# Patient Record
Sex: FEMALE | Race: BLACK OR AFRICAN AMERICAN | NOT HISPANIC OR LATINO
[De-identification: names, ages, dates, MRNs, and addresses within clinical notes are randomized per-mention and may not be internally consistent; named-entity substitution may affect disease eponyms.]

---

## 2017-01-25 ENCOUNTER — RESULT REVIEW (OUTPATIENT)
Age: 57
End: 2017-01-25

## 2017-04-18 ENCOUNTER — RESULT REVIEW (OUTPATIENT)
Age: 57
End: 2017-04-18

## 2017-05-26 ENCOUNTER — RX RENEWAL (OUTPATIENT)
Age: 57
End: 2017-05-26

## 2017-07-27 ENCOUNTER — APPOINTMENT (OUTPATIENT)
Dept: NEUROLOGY | Facility: CLINIC | Age: 57
End: 2017-07-27
Payer: COMMERCIAL

## 2017-07-27 VITALS
BODY MASS INDEX: 49.61 KG/M2 | SYSTOLIC BLOOD PRESSURE: 125 MMHG | OXYGEN SATURATION: 98 % | DIASTOLIC BLOOD PRESSURE: 80 MMHG | WEIGHT: 280 LBS | HEART RATE: 88 BPM | HEIGHT: 63 IN

## 2017-07-27 PROCEDURE — 99204 OFFICE O/P NEW MOD 45 MIN: CPT

## 2017-07-30 ENCOUNTER — OUTPATIENT (OUTPATIENT)
Dept: OUTPATIENT SERVICES | Facility: HOSPITAL | Age: 57
LOS: 1 days | End: 2017-07-30
Payer: COMMERCIAL

## 2017-07-30 PROCEDURE — 70553 MRI BRAIN STEM W/O & W/DYE: CPT | Mod: 26

## 2017-07-30 PROCEDURE — A9585: CPT

## 2017-07-30 PROCEDURE — 70553 MRI BRAIN STEM W/O & W/DYE: CPT

## 2017-08-09 ENCOUNTER — APPOINTMENT (OUTPATIENT)
Dept: SPINE | Facility: CLINIC | Age: 57
End: 2017-08-09

## 2017-08-23 ENCOUNTER — APPOINTMENT (OUTPATIENT)
Dept: SPINE | Facility: CLINIC | Age: 57
End: 2017-08-23
Payer: COMMERCIAL

## 2017-08-23 VITALS
SYSTOLIC BLOOD PRESSURE: 143 MMHG | BODY MASS INDEX: 49.43 KG/M2 | HEART RATE: 87 BPM | DIASTOLIC BLOOD PRESSURE: 89 MMHG | WEIGHT: 279 LBS | HEIGHT: 63 IN

## 2017-08-23 PROCEDURE — 99214 OFFICE O/P EST MOD 30 MIN: CPT

## 2017-08-25 ENCOUNTER — APPOINTMENT (OUTPATIENT)
Dept: NEUROLOGY | Facility: CLINIC | Age: 57
End: 2017-08-25
Payer: COMMERCIAL

## 2017-08-25 VITALS
HEIGHT: 63 IN | HEART RATE: 89 BPM | SYSTOLIC BLOOD PRESSURE: 142 MMHG | BODY MASS INDEX: 47.84 KG/M2 | WEIGHT: 270 LBS | DIASTOLIC BLOOD PRESSURE: 82 MMHG

## 2017-08-25 DIAGNOSIS — R42 DIZZINESS AND GIDDINESS: ICD-10-CM

## 2017-08-25 DIAGNOSIS — G47.33 OBSTRUCTIVE SLEEP APNEA (ADULT) (PEDIATRIC): ICD-10-CM

## 2017-08-25 DIAGNOSIS — M89.9 DISORDER OF BONE, UNSPECIFIED: ICD-10-CM

## 2017-08-25 PROCEDURE — 99213 OFFICE O/P EST LOW 20 MIN: CPT

## 2017-10-25 ENCOUNTER — APPOINTMENT (OUTPATIENT)
Dept: PULMONOLOGY | Facility: CLINIC | Age: 57
End: 2017-10-25

## 2017-12-01 ENCOUNTER — APPOINTMENT (OUTPATIENT)
Dept: NEUROLOGY | Facility: CLINIC | Age: 57
End: 2017-12-01

## 2017-12-05 ENCOUNTER — APPOINTMENT (OUTPATIENT)
Dept: PULMONOLOGY | Facility: CLINIC | Age: 57
End: 2017-12-05

## 2018-01-02 ENCOUNTER — APPOINTMENT (OUTPATIENT)
Dept: PULMONOLOGY | Facility: CLINIC | Age: 58
End: 2018-01-02
Payer: COMMERCIAL

## 2018-01-02 VITALS
TEMPERATURE: 97.8 F | WEIGHT: 287 LBS | RESPIRATION RATE: 18 BRPM | BODY MASS INDEX: 50.85 KG/M2 | DIASTOLIC BLOOD PRESSURE: 80 MMHG | SYSTOLIC BLOOD PRESSURE: 130 MMHG | OXYGEN SATURATION: 98 % | HEIGHT: 63 IN | HEART RATE: 87 BPM

## 2018-01-02 DIAGNOSIS — G47.33 OBSTRUCTIVE SLEEP APNEA (ADULT) (PEDIATRIC): ICD-10-CM

## 2018-01-02 PROCEDURE — 99204 OFFICE O/P NEW MOD 45 MIN: CPT

## 2018-01-05 ENCOUNTER — RESULT REVIEW (OUTPATIENT)
Age: 58
End: 2018-01-05

## 2018-01-31 ENCOUNTER — APPOINTMENT (OUTPATIENT)
Dept: BARIATRICS/WEIGHT MGMT | Facility: CLINIC | Age: 58
End: 2018-01-31

## 2018-02-27 ENCOUNTER — RESULT REVIEW (OUTPATIENT)
Age: 58
End: 2018-02-27

## 2018-04-06 ENCOUNTER — FORM ENCOUNTER (OUTPATIENT)
Age: 58
End: 2018-04-06

## 2018-05-04 ENCOUNTER — FORM ENCOUNTER (OUTPATIENT)
Age: 58
End: 2018-05-04

## 2018-06-18 ENCOUNTER — APPOINTMENT (OUTPATIENT)
Dept: ORTHOPEDIC SURGERY | Facility: CLINIC | Age: 58
End: 2018-06-18

## 2018-08-10 ENCOUNTER — APPOINTMENT (OUTPATIENT)
Dept: ORTHOPEDIC SURGERY | Facility: CLINIC | Age: 58
End: 2018-08-10
Payer: COMMERCIAL

## 2018-08-10 VITALS — SYSTOLIC BLOOD PRESSURE: 126 MMHG | DIASTOLIC BLOOD PRESSURE: 83 MMHG | HEART RATE: 111 BPM

## 2018-08-10 VITALS — HEIGHT: 63 IN | BODY MASS INDEX: 49.26 KG/M2 | WEIGHT: 278 LBS

## 2018-08-10 DIAGNOSIS — M65.312 TRIGGER THUMB, LEFT THUMB: ICD-10-CM

## 2018-08-10 PROCEDURE — 20550 NJX 1 TENDON SHEATH/LIGAMENT: CPT | Mod: LT

## 2018-08-10 PROCEDURE — 99203 OFFICE O/P NEW LOW 30 MIN: CPT | Mod: 25

## 2018-08-20 ENCOUNTER — APPOINTMENT (OUTPATIENT)
Dept: ORTHOPEDIC SURGERY | Facility: CLINIC | Age: 58
End: 2018-08-20
Payer: COMMERCIAL

## 2018-08-20 VITALS
WEIGHT: 270 LBS | HEIGHT: 63 IN | SYSTOLIC BLOOD PRESSURE: 146 MMHG | BODY MASS INDEX: 47.84 KG/M2 | DIASTOLIC BLOOD PRESSURE: 79 MMHG | HEART RATE: 69 BPM

## 2018-08-20 PROCEDURE — 99214 OFFICE O/P EST MOD 30 MIN: CPT

## 2018-08-20 PROCEDURE — 73564 X-RAY EXAM KNEE 4 OR MORE: CPT | Mod: LT

## 2019-02-24 ENCOUNTER — OUTPATIENT (OUTPATIENT)
Dept: OUTPATIENT SERVICES | Facility: HOSPITAL | Age: 59
LOS: 1 days | End: 2019-02-24
Payer: COMMERCIAL

## 2019-02-24 ENCOUNTER — APPOINTMENT (OUTPATIENT)
Dept: MRI IMAGING | Facility: HOSPITAL | Age: 59
End: 2019-02-24
Payer: COMMERCIAL

## 2019-02-24 PROCEDURE — 70553 MRI BRAIN STEM W/O & W/DYE: CPT | Mod: 26

## 2019-02-24 PROCEDURE — 70553 MRI BRAIN STEM W/O & W/DYE: CPT

## 2020-08-18 ENCOUNTER — RESULT REVIEW (OUTPATIENT)
Age: 60
End: 2020-08-18

## 2020-09-29 ENCOUNTER — RESULT REVIEW (OUTPATIENT)
Age: 60
End: 2020-09-29

## 2020-12-04 ENCOUNTER — OUTPATIENT (OUTPATIENT)
Dept: OUTPATIENT SERVICES | Facility: HOSPITAL | Age: 60
LOS: 1 days | End: 2020-12-04
Payer: COMMERCIAL

## 2020-12-04 VITALS
OXYGEN SATURATION: 95 % | TEMPERATURE: 98 F | SYSTOLIC BLOOD PRESSURE: 136 MMHG | DIASTOLIC BLOOD PRESSURE: 96 MMHG | RESPIRATION RATE: 18 BRPM | WEIGHT: 279.99 LBS | HEIGHT: 62 IN | HEART RATE: 94 BPM

## 2020-12-04 DIAGNOSIS — E11.9 TYPE 2 DIABETES MELLITUS WITHOUT COMPLICATIONS: ICD-10-CM

## 2020-12-04 DIAGNOSIS — N95.0 POSTMENOPAUSAL BLEEDING: ICD-10-CM

## 2020-12-04 DIAGNOSIS — Z01.818 ENCOUNTER FOR OTHER PREPROCEDURAL EXAMINATION: ICD-10-CM

## 2020-12-04 DIAGNOSIS — N84.0 POLYP OF CORPUS UTERI: ICD-10-CM

## 2020-12-04 DIAGNOSIS — I10 ESSENTIAL (PRIMARY) HYPERTENSION: ICD-10-CM

## 2020-12-04 DIAGNOSIS — D21.9 BENIGN NEOPLASM OF CONNECTIVE AND OTHER SOFT TISSUE, UNSPECIFIED: Chronic | ICD-10-CM

## 2020-12-04 LAB
ANION GAP SERPL CALC-SCNC: 5 MMOL/L — SIGNIFICANT CHANGE UP (ref 5–17)
APTT BLD: 32.8 SEC — SIGNIFICANT CHANGE UP (ref 27.5–35.5)
BLD GP AB SCN SERPL QL: SIGNIFICANT CHANGE UP
BUN SERPL-MCNC: 18 MG/DL — SIGNIFICANT CHANGE UP (ref 7–18)
CALCIUM SERPL-MCNC: 9.5 MG/DL — SIGNIFICANT CHANGE UP (ref 8.4–10.5)
CHLORIDE SERPL-SCNC: 107 MMOL/L — SIGNIFICANT CHANGE UP (ref 96–108)
CO2 SERPL-SCNC: 29 MMOL/L — SIGNIFICANT CHANGE UP (ref 22–31)
CREAT SERPL-MCNC: 0.88 MG/DL — SIGNIFICANT CHANGE UP (ref 0.5–1.3)
GLUCOSE SERPL-MCNC: 123 MG/DL — HIGH (ref 70–99)
HCT VFR BLD CALC: 41.3 % — SIGNIFICANT CHANGE UP (ref 34.5–45)
HGB BLD-MCNC: 12.7 G/DL — SIGNIFICANT CHANGE UP (ref 11.5–15.5)
INR BLD: 0.97 RATIO — SIGNIFICANT CHANGE UP (ref 0.88–1.16)
MCHC RBC-ENTMCNC: 26.8 PG — LOW (ref 27–34)
MCHC RBC-ENTMCNC: 30.8 GM/DL — LOW (ref 32–36)
MCV RBC AUTO: 87.1 FL — SIGNIFICANT CHANGE UP (ref 80–100)
NRBC # BLD: 0 /100 WBCS — SIGNIFICANT CHANGE UP (ref 0–0)
PLATELET # BLD AUTO: 260 K/UL — SIGNIFICANT CHANGE UP (ref 150–400)
POTASSIUM SERPL-MCNC: 3.4 MMOL/L — LOW (ref 3.5–5.3)
POTASSIUM SERPL-SCNC: 3.4 MMOL/L — LOW (ref 3.5–5.3)
PROTHROM AB SERPL-ACNC: 11.6 SEC — SIGNIFICANT CHANGE UP (ref 10.6–13.6)
RBC # BLD: 4.74 M/UL — SIGNIFICANT CHANGE UP (ref 3.8–5.2)
RBC # FLD: 14.5 % — SIGNIFICANT CHANGE UP (ref 10.3–14.5)
SODIUM SERPL-SCNC: 141 MMOL/L — SIGNIFICANT CHANGE UP (ref 135–145)
WBC # BLD: 7.29 K/UL — SIGNIFICANT CHANGE UP (ref 3.8–10.5)
WBC # FLD AUTO: 7.29 K/UL — SIGNIFICANT CHANGE UP (ref 3.8–10.5)

## 2020-12-04 RX ORDER — SODIUM CHLORIDE 9 MG/ML
3 INJECTION INTRAMUSCULAR; INTRAVENOUS; SUBCUTANEOUS EVERY 8 HOURS
Refills: 0 | Status: DISCONTINUED | OUTPATIENT
Start: 2020-12-18 | End: 2020-12-18

## 2020-12-04 NOTE — H&P PST ADULT - NSICDXPROBLEM_GEN_ALL_CORE_FT
PROBLEM DIAGNOSES  Problem: Postmenopausal bleeding  Assessment and Plan: Dilation and Curettage  Hysteroscopy      Problem: Polyp of corpus uteri  Assessment and Plan: Dilation and Curettage  Hysteroscopy      Problem: Diabetes  Assessment and Plan: No blood sugar medication before coming to the hospital     Problem: Hypertension  Assessment and Plan: Continue your medication as prescribed

## 2020-12-04 NOTE — H&P PST ADULT - HISTORY OF PRESENT ILLNESS
61 yo female with history of Obesity, DM, HTN, reports the above.   She is scheduled for : Dilation and Curettage  Hysteroscopy, on 12/18/20

## 2020-12-13 PROBLEM — Z00.00 ENCOUNTER FOR PREVENTIVE HEALTH EXAMINATION: Noted: 2020-12-13

## 2020-12-15 ENCOUNTER — APPOINTMENT (OUTPATIENT)
Dept: DISASTER EMERGENCY | Facility: CLINIC | Age: 60
End: 2020-12-15

## 2020-12-16 LAB — SARS-COV-2 N GENE NPH QL NAA+PROBE: NOT DETECTED

## 2020-12-17 ENCOUNTER — TRANSCRIPTION ENCOUNTER (OUTPATIENT)
Age: 60
End: 2020-12-17

## 2020-12-18 ENCOUNTER — RESULT REVIEW (OUTPATIENT)
Age: 60
End: 2020-12-18

## 2020-12-18 ENCOUNTER — OUTPATIENT (OUTPATIENT)
Dept: OUTPATIENT SERVICES | Facility: HOSPITAL | Age: 60
LOS: 1 days | End: 2020-12-18
Payer: COMMERCIAL

## 2020-12-18 VITALS
SYSTOLIC BLOOD PRESSURE: 146 MMHG | OXYGEN SATURATION: 100 % | HEART RATE: 81 BPM | DIASTOLIC BLOOD PRESSURE: 70 MMHG | RESPIRATION RATE: 17 BRPM | TEMPERATURE: 98 F

## 2020-12-18 VITALS
DIASTOLIC BLOOD PRESSURE: 96 MMHG | HEIGHT: 62 IN | RESPIRATION RATE: 18 BRPM | OXYGEN SATURATION: 98 % | WEIGHT: 279.99 LBS | HEART RATE: 97 BPM | TEMPERATURE: 98 F | SYSTOLIC BLOOD PRESSURE: 151 MMHG

## 2020-12-18 DIAGNOSIS — Z01.818 ENCOUNTER FOR OTHER PREPROCEDURAL EXAMINATION: ICD-10-CM

## 2020-12-18 DIAGNOSIS — N84.0 POLYP OF CORPUS UTERI: ICD-10-CM

## 2020-12-18 DIAGNOSIS — D21.9 BENIGN NEOPLASM OF CONNECTIVE AND OTHER SOFT TISSUE, UNSPECIFIED: Chronic | ICD-10-CM

## 2020-12-18 DIAGNOSIS — N95.0 POSTMENOPAUSAL BLEEDING: ICD-10-CM

## 2020-12-18 LAB — BLD GP AB SCN SERPL QL: SIGNIFICANT CHANGE UP

## 2020-12-18 PROCEDURE — 88305 TISSUE EXAM BY PATHOLOGIST: CPT | Mod: 26

## 2020-12-18 RX ORDER — ONDANSETRON 8 MG/1
4 TABLET, FILM COATED ORAL ONCE
Refills: 0 | Status: DISCONTINUED | OUTPATIENT
Start: 2020-12-18 | End: 2020-12-18

## 2020-12-18 RX ORDER — OXYCODONE AND ACETAMINOPHEN 5; 325 MG/1; MG/1
2 TABLET ORAL EVERY 6 HOURS
Refills: 0 | Status: DISCONTINUED | OUTPATIENT
Start: 2020-12-18 | End: 2020-12-18

## 2020-12-18 RX ORDER — SODIUM CHLORIDE 9 MG/ML
1000 INJECTION, SOLUTION INTRAVENOUS
Refills: 0 | Status: DISCONTINUED | OUTPATIENT
Start: 2020-12-18 | End: 2020-12-18

## 2020-12-18 RX ORDER — HYDROMORPHONE HYDROCHLORIDE 2 MG/ML
0.5 INJECTION INTRAMUSCULAR; INTRAVENOUS; SUBCUTANEOUS
Refills: 0 | Status: DISCONTINUED | OUTPATIENT
Start: 2020-12-18 | End: 2020-12-18

## 2020-12-18 RX ORDER — ONDANSETRON 8 MG/1
4 TABLET, FILM COATED ORAL EVERY 6 HOURS
Refills: 0 | Status: DISCONTINUED | OUTPATIENT
Start: 2020-12-18 | End: 2020-12-25

## 2020-12-18 RX ORDER — IBUPROFEN 200 MG
400 TABLET ORAL EVERY 8 HOURS
Refills: 0 | Status: DISCONTINUED | OUTPATIENT
Start: 2020-12-18 | End: 2020-12-25

## 2020-12-18 RX ADMIN — HYDROMORPHONE HYDROCHLORIDE 0.5 MILLIGRAM(S): 2 INJECTION INTRAMUSCULAR; INTRAVENOUS; SUBCUTANEOUS at 11:49

## 2020-12-18 RX ADMIN — SODIUM CHLORIDE 3 MILLILITER(S): 9 INJECTION INTRAMUSCULAR; INTRAVENOUS; SUBCUTANEOUS at 07:42

## 2020-12-18 NOTE — BRIEF OPERATIVE NOTE - NSICDXBRIEFPROCEDURE_GEN_ALL_CORE_FT
PROCEDURES:  Hysteroscopy 18-Dec-2020 11:31:59  Rory Gold  D&C (dilatation and curettage, scraping of uterus) 18-Dec-2020 11:31:49  Rory Gold

## 2020-12-18 NOTE — ASU DISCHARGE PLAN (ADULT/PEDIATRIC) - CARE PROVIDER_API CALL
Rory Gold (DO)  Obstetrics and Gynecology  [address] [place] [address]  Phone: [phone]  Fax: [phone]  Established Patient  Follow Up Time: 2 weeks

## 2020-12-22 LAB — SURGICAL PATHOLOGY STUDY: SIGNIFICANT CHANGE UP

## 2022-02-04 RX ORDER — MAGNESIUM OXIDE 400 MG ORAL TABLET 241.3 MG
1 TABLET ORAL
Qty: 0 | Refills: 0 | DISCHARGE

## 2022-02-04 RX ORDER — METFORMIN HYDROCHLORIDE 850 MG/1
1 TABLET ORAL
Qty: 0 | Refills: 0 | DISCHARGE

## 2022-04-25 PROBLEM — I10 ESSENTIAL (PRIMARY) HYPERTENSION: Chronic | Status: ACTIVE | Noted: 2020-12-04

## 2022-05-09 ENCOUNTER — RESULT REVIEW (OUTPATIENT)
Age: 62
End: 2022-05-09

## 2022-05-16 ENCOUNTER — APPOINTMENT (OUTPATIENT)
Dept: SURGERY | Facility: CLINIC | Age: 62
End: 2022-05-16
Payer: COMMERCIAL

## 2022-05-16 VITALS
WEIGHT: 278 LBS | BODY MASS INDEX: 51.16 KG/M2 | HEIGHT: 62 IN | OXYGEN SATURATION: 99 % | HEART RATE: 77 BPM | RESPIRATION RATE: 18 BRPM | DIASTOLIC BLOOD PRESSURE: 90 MMHG | TEMPERATURE: 97.8 F | SYSTOLIC BLOOD PRESSURE: 160 MMHG

## 2022-05-16 DIAGNOSIS — Z63.5 DISRUPTION OF FAMILY BY SEPARATION AND DIVORCE: ICD-10-CM

## 2022-05-16 DIAGNOSIS — M54.9 DORSALGIA, UNSPECIFIED: ICD-10-CM

## 2022-05-16 DIAGNOSIS — Z82.49 FAMILY HISTORY OF ISCHEMIC HEART DISEASE AND OTHER DISEASES OF THE CIRCULATORY SYSTEM: ICD-10-CM

## 2022-05-16 DIAGNOSIS — M19.90 UNSPECIFIED OSTEOARTHRITIS, UNSPECIFIED SITE: ICD-10-CM

## 2022-05-16 PROCEDURE — 99204 OFFICE O/P NEW MOD 45 MIN: CPT

## 2022-05-16 RX ORDER — HYALURONATE SOD, CROSS-LINKED 30 MG/3 ML
30 SYRINGE (ML) INTRAARTICULAR
Qty: 1 | Refills: 0 | Status: DISCONTINUED | COMMUNITY
Start: 2018-08-20 | End: 2022-05-16

## 2022-05-16 RX ORDER — MECLIZINE HYDROCHLORIDE 12.5 MG/1
12.5 TABLET ORAL 3 TIMES DAILY
Qty: 90 | Refills: 1 | Status: DISCONTINUED | COMMUNITY
Start: 2017-07-27 | End: 2022-05-16

## 2022-05-16 RX ORDER — MELOXICAM 15 MG/1
TABLET ORAL
Refills: 0 | Status: DISCONTINUED | COMMUNITY
End: 2022-05-16

## 2022-05-16 RX ORDER — LOSARTAN POTASSIUM 100 MG/1
100 TABLET, FILM COATED ORAL
Refills: 0 | Status: DISCONTINUED | COMMUNITY
End: 2022-05-16

## 2022-05-16 RX ORDER — DICLOFENAC SODIUM 50 MG/1
50 TABLET, DELAYED RELEASE ORAL
Qty: 60 | Refills: 2 | Status: DISCONTINUED | COMMUNITY
Start: 2018-08-20 | End: 2022-05-16

## 2022-05-16 SDOH — SOCIAL STABILITY - SOCIAL INSECURITY: DISRUPTION OF FAMILY BY SEPARATION AND DIVORCE: Z63.5

## 2022-05-16 NOTE — HISTORY OF PRESENT ILLNESS
[de-identified] : VESNA  is a 61 year  female  here for a consultation for weight loss surgery. \par The patient has been struggling with obesity for years.  She has attempted several diet and exercise programs without success.  She feels a mix of life stressors, unhealthy eating, and sedentary work have made it very difficult to lose the weight.  The excess weight is starting to significantly affect her health and lifestyle.\par \par Medical history is significant for morbid obesity, hypertension, type 2 diabetes.\par Surgical history is significant for myomectomy.\par The patient denies prior history of blood clot or abnormal bleeding.\par The patient denies prior history of dysphagia.\par She is a non-smoker.\par She works as a .\par \par Referred by her primary care doctor, Dr. Vonnie Douglas.

## 2022-05-16 NOTE — PHYSICAL EXAM
[Obese, well nourished, in no acute distress] : obese, well nourished, in no acute distress [Normal] : affect appropriate [de-identified] : Normal respirations [de-identified] : Soft, nontender, nondistended

## 2022-05-16 NOTE — ASSESSMENT
[FreeTextEntry1] : 61-year-old female with longstanding history of morbid obesity (height 5 feet 2 inches, weight 278 pounds, BMI 50) with comorbidities of hypertension and type 2 diabetes presents for evaluation for bariatric surgery.\par \par The patient has failed multiple prior attempts at weight loss and is now dealing with the side effects, risk factors, and poor quality of life associated with morbid obesity.  They would benefit from surgical weight loss as outlined in the NIH and  ASMBS consensus statements on bariatric surgery.  Surgical intervention is medically indicated.\par \par My impression is that the patient is a reasonable candidate for laparoscopic sleeve gastrectomy, pending GI work-up.\par

## 2022-06-13 ENCOUNTER — APPOINTMENT (OUTPATIENT)
Dept: SURGERY | Facility: CLINIC | Age: 62
End: 2022-06-13
Payer: COMMERCIAL

## 2022-06-18 ENCOUNTER — APPOINTMENT (OUTPATIENT)
Dept: MRI IMAGING | Facility: CLINIC | Age: 62
End: 2022-06-18

## 2022-06-20 ENCOUNTER — APPOINTMENT (OUTPATIENT)
Dept: SURGERY | Facility: CLINIC | Age: 62
End: 2022-06-20
Payer: COMMERCIAL

## 2022-06-20 VITALS — WEIGHT: 278 LBS | BODY MASS INDEX: 51.16 KG/M2 | HEIGHT: 62 IN

## 2022-06-20 PROCEDURE — 99213 OFFICE O/P EST LOW 20 MIN: CPT | Mod: 95

## 2022-06-20 NOTE — ASSESSMENT
[FreeTextEntry1] : 61-year-old female with longstanding history of morbid obesity (BMI 50) with comorbidities of hypertension and type 2 diabetes presents for continuing medical weight management in preparation for laparoscopic sleeve gastrectomy.\par

## 2022-06-20 NOTE — HISTORY OF PRESENT ILLNESS
[de-identified] : VESNA is a 61 year old female presenting for a monthly weight check prior to laparoscopic sleeve gastrectomy. \par No changes in medical history reported.  Continuing to moderate portion sizes and make more healthful choices.  Increasing activity levels as much as possible. \par Scheduled appt with dietician and pulm.\par Awaiting callback from cardiologist.

## 2022-06-23 ENCOUNTER — APPOINTMENT (OUTPATIENT)
Dept: CARDIOLOGY | Facility: CLINIC | Age: 62
End: 2022-06-23

## 2022-06-30 ENCOUNTER — OUTPATIENT (OUTPATIENT)
Dept: OUTPATIENT SERVICES | Facility: HOSPITAL | Age: 62
LOS: 1 days | End: 2022-06-30
Payer: COMMERCIAL

## 2022-06-30 ENCOUNTER — APPOINTMENT (OUTPATIENT)
Dept: MRI IMAGING | Facility: CLINIC | Age: 62
End: 2022-06-30

## 2022-06-30 DIAGNOSIS — D21.9 BENIGN NEOPLASM OF CONNECTIVE AND OTHER SOFT TISSUE, UNSPECIFIED: Chronic | ICD-10-CM

## 2022-06-30 DIAGNOSIS — Z00.8 ENCOUNTER FOR OTHER GENERAL EXAMINATION: ICD-10-CM

## 2022-06-30 PROCEDURE — A9585: CPT

## 2022-06-30 PROCEDURE — 70553 MRI BRAIN STEM W/O & W/DYE: CPT

## 2022-06-30 PROCEDURE — 70553 MRI BRAIN STEM W/O & W/DYE: CPT | Mod: 26

## 2022-07-15 ENCOUNTER — APPOINTMENT (OUTPATIENT)
Dept: BARIATRICS | Facility: CLINIC | Age: 62
End: 2022-07-15
Payer: COMMERCIAL

## 2022-07-15 VITALS — HEIGHT: 62 IN | WEIGHT: 278 LBS | BODY MASS INDEX: 51.16 KG/M2

## 2022-07-15 DIAGNOSIS — Z00.00 ENCOUNTER FOR GENERAL ADULT MEDICAL EXAMINATION W/OUT ABNORMAL FINDINGS: ICD-10-CM

## 2022-07-15 PROCEDURE — 97802 MEDICAL NUTRITION INDIV IN: CPT | Mod: 95

## 2022-07-15 PROCEDURE — G0447 BEHAVIOR COUNSEL OBESITY 15M: CPT | Mod: 95

## 2022-07-15 PROCEDURE — 97804 MEDICAL NUTRITION GROUP: CPT | Mod: 95

## 2022-07-22 NOTE — REASON FOR VISIT
[Home] : at home, [unfilled] , at the time of the visit. [Medical Office: ([facility])___] : at the medical office located in  [Patient] : the patient [Follow-Up Visit] : a follow-up visit for [Morbid Obesity (BMI>40)] : morbid obesity (bmi>40)

## 2022-07-25 ENCOUNTER — APPOINTMENT (OUTPATIENT)
Dept: SURGERY | Facility: CLINIC | Age: 62
End: 2022-07-25

## 2022-07-25 VITALS — BODY MASS INDEX: 50.48 KG/M2 | WEIGHT: 276 LBS

## 2022-07-25 PROCEDURE — 99212 OFFICE O/P EST SF 10 MIN: CPT | Mod: 95

## 2022-07-25 NOTE — HISTORY OF PRESENT ILLNESS
[de-identified] : VESNA is a 61 year old female presenting for a monthly weight check prior to laparoscopic sleeve gastrectomy. \par No changes in medical history reported.  Continuing to moderate portion sizes and make more healthful choices.  Increasing activity levels as much as possible. \par Saw nutritionist.\par Has endoscopy scheduled for August.\par Also seeing cardiologist.

## 2022-08-01 ENCOUNTER — APPOINTMENT (OUTPATIENT)
Dept: CARDIOLOGY | Facility: CLINIC | Age: 62
End: 2022-08-01

## 2022-08-01 ENCOUNTER — NON-APPOINTMENT (OUTPATIENT)
Age: 62
End: 2022-08-01

## 2022-08-01 VITALS
DIASTOLIC BLOOD PRESSURE: 90 MMHG | WEIGHT: 281 LBS | TEMPERATURE: 97.7 F | HEIGHT: 62 IN | RESPIRATION RATE: 16 BRPM | OXYGEN SATURATION: 99 % | BODY MASS INDEX: 51.71 KG/M2 | SYSTOLIC BLOOD PRESSURE: 130 MMHG | HEART RATE: 86 BPM

## 2022-08-01 PROCEDURE — 99203 OFFICE O/P NEW LOW 30 MIN: CPT | Mod: 25

## 2022-08-01 PROCEDURE — 93000 ELECTROCARDIOGRAM COMPLETE: CPT | Mod: NC

## 2022-08-01 RX ORDER — METFORMIN HYDROCHLORIDE 625 MG/1
TABLET ORAL
Refills: 0 | Status: COMPLETED | COMMUNITY
End: 2022-08-01

## 2022-08-01 RX ORDER — AMLODIPINE BESYLATE 5 MG/1
TABLET ORAL
Refills: 0 | Status: COMPLETED | COMMUNITY
End: 2022-08-01

## 2022-08-01 NOTE — DISCUSSION/SUMMARY
[FreeTextEntry1] : This is a 61-year-old black female with past medical history significant for non-insulin-dependent diabetes mellitus, hypertension, who comes in for cardio metabolic consultation.  The patient is planning on bariatric surgery in the next few months.\par She denies chest pain, shortness of breath, dizziness, palpitations or syncope.  She does complain of occasional dyspnea on exertion.\par The patient's cardiac risk factors include non-insulin-dependent diabetes mellitus, hypertension, history of smoking.\par Electrocardiogram done August 1, 2022 demonstrated normal sinus rhythm at a rate of 86 bpm is otherwise remarkable for left atrial abnormality.\par The patient will schedule exercise stress test to evaluate his symptoms, and rule out significant coronary artery disease.\par She will schedule an echo Doppler examination to evaluate her left ventricular function, chamber size, rule out hypertrophy, rule out mitral valve prolapse, and evaluate her pulmonary pressure.\par She is instructed follow-up with you regarding her overall medical care.\par She will follow-up with me after above-noted diagnostic tests are completed.\par The patient understands that aerobic exercises must be increased to 40 minutes 4 times per week. A detailed discussion of lifestyle modification was done today. The patient has a good understanding of the diagnosis, and treatment plan. Lifestyle modification was also outlined.\par \par Thank you for allowing to participate in care of your patient.\par \par This is a 61 year old female with a past medical history of hypertension and non-insulin-dependent diabetes mellitus who comes in for a cardiometabolic consultation. Patient needs clearance for bariatric surgery which is being performed at [facility]. \par Patient states that she is doing well and currently has no cardiac complaints. \par \par Patient denies chest pain, shortness of breath, dizziness or syncope. \par \par Patient's cardiac risk factors include hypertension, non-insulin-dependent diabetes mellitus and family history significant for hypertension and diabetes mellitus.

## 2022-08-01 NOTE — REASON FOR VISIT
[CV Risk Factors and Non-Cardiac Disease] : CV risk factors and non-cardiac disease [Hypertension] : hypertension [Other: ____] : [unfilled] [FreeTextEntry1] : This is a 61 year old female with a past medical history of hypertension and non-insulin-dependent diabetes mellitus who comes in for a cardiometabolic consultation. Patient needs clearance for bariatric surgery which is being performed at [facility]. \par Patient states that she is doing well and currently has no cardiac complaints. \par \par Patient denies chest pain, shortness of breath, dizziness or syncope. \par \par Patient's cardiac risk factors include hypertension, non-insulin-dependent diabetes mellitus and family history significant for hypertension and diabetes mellitus.

## 2022-08-01 NOTE — PHYSICAL EXAM
[Well Developed] : well developed [Well Nourished] : well nourished [No Acute Distress] : no acute distress [Normal Conjunctiva] : normal conjunctiva [Normal Venous Pressure] : normal venous pressure [No Carotid Bruit] : no carotid bruit [Normal S1, S2] : normal S1, S2 [No Murmur] : no murmur [No Rub] : no rub [5th Left ICS - MCL] : palpated at the 5th LICS in the midclavicular line [Normal] : normal [No Precordial Heave] : no precordial heave was noted [Normal Rate] : normal [Rhythm Regular] : regular [Normal S1] : normal S1 [Normal S2] : normal S2 [No Gallop] : no gallop heard [I] : a grade 1 [No Pitting Edema] : no pitting edema present [2+] : left 2+ [No Abnormalities] : the abdominal aorta was not enlarged and no bruit was heard [Clear Lung Fields] : clear lung fields [Good Air Entry] : good air entry [No Respiratory Distress] : no respiratory distress  [Soft] : abdomen soft [Non Tender] : non-tender [No Masses/organomegaly] : no masses/organomegaly [Normal Bowel Sounds] : normal bowel sounds [Normal Gait] : normal gait [No Edema] : no edema [No Cyanosis] : no cyanosis [No Clubbing] : no clubbing [No Varicosities] : no varicosities [No Rash] : no rash [No Skin Lesions] : no skin lesions [Moves all extremities] : moves all extremities [No Focal Deficits] : no focal deficits [Normal Speech] : normal speech [Alert and Oriented] : alert and oriented [Normal memory] : normal memory [S3] : no S3 [S4] : no S4 [Click] : no click [Distant] : the heart sounds were ~L not distant [Pericardial Rub] : no pericardial rub [Right Carotid Bruit] : no bruit heard over the right carotid [Left Carotid Bruit] : no bruit heard over the left carotid [Right Femoral Bruit] : no bruit heard over the right femoral artery [Left Femoral Bruit] : no bruit heard over the left femoral artery [Bruit] : no bruit heard

## 2022-08-02 ENCOUNTER — APPOINTMENT (OUTPATIENT)
Dept: ORTHOPEDIC SURGERY | Facility: CLINIC | Age: 62
End: 2022-08-02

## 2022-08-02 ENCOUNTER — APPOINTMENT (OUTPATIENT)
Dept: RADIOLOGY | Facility: HOSPITAL | Age: 62
End: 2022-08-02

## 2022-08-02 ENCOUNTER — OUTPATIENT (OUTPATIENT)
Dept: OUTPATIENT SERVICES | Facility: HOSPITAL | Age: 62
LOS: 1 days | End: 2022-08-02
Payer: COMMERCIAL

## 2022-08-02 ENCOUNTER — APPOINTMENT (OUTPATIENT)
Dept: ULTRASOUND IMAGING | Facility: HOSPITAL | Age: 62
End: 2022-08-02

## 2022-08-02 VITALS — BODY MASS INDEX: 51.71 KG/M2 | WEIGHT: 281 LBS | HEIGHT: 62 IN

## 2022-08-02 DIAGNOSIS — Z01.818 ENCOUNTER FOR OTHER PREPROCEDURAL EXAMINATION: ICD-10-CM

## 2022-08-02 DIAGNOSIS — D21.9 BENIGN NEOPLASM OF CONNECTIVE AND OTHER SOFT TISSUE, UNSPECIFIED: Chronic | ICD-10-CM

## 2022-08-02 DIAGNOSIS — E66.01 MORBID (SEVERE) OBESITY DUE TO EXCESS CALORIES: ICD-10-CM

## 2022-08-02 PROCEDURE — 72170 X-RAY EXAM OF PELVIS: CPT

## 2022-08-02 PROCEDURE — 99214 OFFICE O/P EST MOD 30 MIN: CPT

## 2022-08-02 PROCEDURE — 72100 X-RAY EXAM L-S SPINE 2/3 VWS: CPT

## 2022-08-02 PROCEDURE — 76700 US EXAM ABDOM COMPLETE: CPT

## 2022-08-02 PROCEDURE — 73560 X-RAY EXAM OF KNEE 1 OR 2: CPT | Mod: LT

## 2022-08-02 PROCEDURE — 74246 X-RAY XM UPR GI TRC 2CNTRST: CPT | Mod: 26

## 2022-08-02 PROCEDURE — 73565 X-RAY EXAM OF KNEES: CPT

## 2022-08-02 PROCEDURE — 74246 X-RAY XM UPR GI TRC 2CNTRST: CPT

## 2022-08-02 PROCEDURE — 76700 US EXAM ABDOM COMPLETE: CPT | Mod: 26

## 2022-08-02 RX ORDER — METFORMIN ER 500 MG 500 MG/1
500 TABLET ORAL DAILY
Refills: 0 | Status: ACTIVE | COMMUNITY

## 2022-08-02 NOTE — HISTORY OF PRESENT ILLNESS
[de-identified] : Patient presents with chronic low back pain that has been progressively worsening. No recent injury. Describes pain across the back. She has been using topical creams and taking Tylenol with moderate relief. She saw neurologist last week, who gave injection (TPI?). There was temporary relief, but states now experiencing radiating pain in both legs with tingling.\par \par Also, notes persistent left knee pain that has been progressing. No new injury. Difficulty with prolonged walking and stairs. Denies buckling/locking. She did have CSI in the past (1/2021) with some relief. States FBS is "usually aroung 100." She is in the process of scheduling weight loss surgery.

## 2022-08-02 NOTE — PHYSICAL EXAM
[Bending to right] : bending to right [Left] : left knee [5___] : hamstring 5[unfilled]/5 [] : no erythema [TWNoteComboBox7] : flexion 120 degrees [de-identified] : extension 3 degrees

## 2022-08-02 NOTE — IMAGING
[Left] : left knee [Lateral] : lateral [[place]] : [place] [Mild patellofemoral OA] : Mild patellofemoral OA [Moderate tricompartmental OA medial narrowing] : Moderate tricompartmental OA medial narrowing [Right] : right knee [AP Standing] : anteroposterior standing [advanced tricompartmental OA with medial compartment narrowing and varus alignment] : advanced tricompartmental OA with medial compartment narrowing and varus alignment [Disc space narrowing] : Disc space narrowing [Spondylolithesis] : Spondylolithesis [AP] : anteroposterior [There are no fractures, subluxations or dislocations. No significant abnormalities are seen] : There are no fractures, subluxations or dislocations. No significant abnormalities are seen [FreeTextEntry1] : Lumbar x-rays slightly distorted due to GI contrast from test done today. [FreeTextEntry9] : left knee slightly more medial joint narrowing than r

## 2022-08-02 NOTE — DISCUSSION/SUMMARY
[de-identified] : The patient was advised of the diagnosis. The natural history of the pathology was explained in full to the patient in layman's terms. All questions were answered. The risks and benefits of surgical and non-surgical treatment alternatives were explained in full to the patient.\par \par \par Total knee left discussed. would like to try visco first. also discussed back and spondylolisthesis and pain to both legs. all explained. will put in for auth for euflexa left and right knee knee.\par \par MRI lumbar and may need pain management.\par To have weight reduction surgery in september. also will try and get auth for euflexa for both knees since both have significant oa.

## 2022-08-03 ENCOUNTER — APPOINTMENT (OUTPATIENT)
Dept: CARDIOLOGY | Facility: CLINIC | Age: 62
End: 2022-08-03

## 2022-08-03 PROCEDURE — 93306 TTE W/DOPPLER COMPLETE: CPT

## 2022-08-10 ENCOUNTER — APPOINTMENT (OUTPATIENT)
Dept: CARDIOLOGY | Facility: CLINIC | Age: 62
End: 2022-08-10

## 2022-08-10 VITALS
BODY MASS INDEX: 51.53 KG/M2 | TEMPERATURE: 97.1 F | RESPIRATION RATE: 16 BRPM | WEIGHT: 280 LBS | HEART RATE: 102 BPM | SYSTOLIC BLOOD PRESSURE: 136 MMHG | DIASTOLIC BLOOD PRESSURE: 86 MMHG | OXYGEN SATURATION: 99 % | HEIGHT: 62 IN

## 2022-08-10 PROCEDURE — 93015 CV STRESS TEST SUPVJ I&R: CPT

## 2022-08-10 PROCEDURE — 99213 OFFICE O/P EST LOW 20 MIN: CPT | Mod: 25

## 2022-08-10 NOTE — REASON FOR VISIT
[CV Risk Factors and Non-Cardiac Disease] : CV risk factors and non-cardiac disease [Hypertension] : hypertension [Other: ____] : [unfilled] [FreeTextEntry1] : This is a 61 year old female with a past medical history of hypertension and non-insulin-dependent diabetes mellitus who comes in for a cardiometabolic consultation. Patient needs clearance for bariatric surgery which is being performed at [facility]. Patient had a stress test today which is normal with normal LV function. \par Patient admits to dyspnea on exertion but otherwise states that she is doing well and currently has no cardiac complaints. \par \par Patient denies chest pain, shortness of breath, dizziness or syncope. \par \par Patient's cardiac risk factors include hypertension, non-insulin-dependent diabetes mellitus and family history significant for hypertension and diabetes mellitus.

## 2022-08-10 NOTE — PHYSICAL EXAM
[Well Developed] : well developed [Well Nourished] : well nourished [No Acute Distress] : no acute distress [Normal Conjunctiva] : normal conjunctiva [Normal Venous Pressure] : normal venous pressure [No Carotid Bruit] : no carotid bruit [Normal S1, S2] : normal S1, S2 [No Rub] : no rub [5th Left ICS - MCL] : palpated at the 5th LICS in the midclavicular line [Normal] : normal [No Precordial Heave] : no precordial heave was noted [Normal Rate] : normal [Rhythm Regular] : regular [Normal S1] : normal S1 [Normal S2] : normal S2 [No Gallop] : no gallop heard [I] : a grade 1 [No Pitting Edema] : no pitting edema present [2+] : left 2+ [No Abnormalities] : the abdominal aorta was not enlarged and no bruit was heard [Clear Lung Fields] : clear lung fields [Good Air Entry] : good air entry [No Respiratory Distress] : no respiratory distress  [Soft] : abdomen soft [Non Tender] : non-tender [No Masses/organomegaly] : no masses/organomegaly [Normal Bowel Sounds] : normal bowel sounds [Normal Gait] : normal gait [No Edema] : no edema [No Cyanosis] : no cyanosis [No Clubbing] : no clubbing [No Varicosities] : no varicosities [No Rash] : no rash [No Skin Lesions] : no skin lesions [Moves all extremities] : moves all extremities [No Focal Deficits] : no focal deficits [Normal Speech] : normal speech [Alert and Oriented] : alert and oriented [Normal memory] : normal memory [S3] : no S3 [S4] : no S4 [Click] : no click [Pericardial Rub] : no pericardial rub [Right Carotid Bruit] : no bruit heard over the right carotid [Left Carotid Bruit] : no bruit heard over the left carotid [Right Femoral Bruit] : no bruit heard over the right femoral artery [Left Femoral Bruit] : no bruit heard over the left femoral artery [Bruit] : no bruit heard

## 2022-08-10 NOTE — DISCUSSION/SUMMARY
[FreeTextEntry1] : This is a 61-year-old black female with past medical history significant for non-insulin-dependent diabetes mellitus, hypertension, who comes in for cardio metabolic and preoperative consultation. \par The patient is scheduled for bariatric surgery.\par She denies chest pain, shortness of breath, dizziness, palpitations or syncope.  She does complain of occasional dyspnea on exertion.\par The patient's cardiac risk factors include non-insulin-dependent diabetes mellitus, hypertension, history of smoking.\par The patient had normal exercise stress test August 10, 2022.\par Echo Doppler examination done August 3, 2022 demonstrated minimal to mild mitral valve regurgitation, mild tricuspid valve regurgitation, satisfactory left ventricular function, mild concentric left ventricular hypertrophy, thinning and dyskinesis of the interatrial septum and normal ejection fraction of 65%.\par Electrocardiogram done August 1, 2022 demonstrated normal sinus rhythm at a rate of 86 bpm is otherwise remarkable for left atrial abnormality.\par Blood work done August 1, 2022 demonstrated hemoglobin A1c of 7.0, direct LDL of 105 mg/dL and cholesterol 230 mg/dL, and triglycerides 186 mg/dL.\par As a diabetic the patient should be on statin therapy.  However with bariatric surgery, diabetes may normalize as well as a lipid profile.  She will follow-up with me in 6 months post bariatric surgery to reevaluate her cardio metabolic profile.  No medication is indicated at this time.  Lifestyle change in diet with her bariatric intervention.\par \par \par \par This patient is clear from a cardiac standpoint for bariatric surgery.  She has a normal ejection fraction of 65 to 68%.  Please avoid overhydration.  Maintain prophylaxis for deep venous thrombosis.  The patient should have an incentive spirometer in the perioperative period.\par \par \par Thank you for allowing to participate in care of your patient.\par \par \par

## 2022-08-16 ENCOUNTER — APPOINTMENT (OUTPATIENT)
Dept: ORTHOPEDIC SURGERY | Facility: CLINIC | Age: 62
End: 2022-08-16

## 2022-08-16 VITALS — BODY MASS INDEX: 51.53 KG/M2 | HEIGHT: 62 IN | WEIGHT: 280 LBS

## 2022-08-16 DIAGNOSIS — M43.16 SPONDYLOLISTHESIS, LUMBAR REGION: ICD-10-CM

## 2022-08-16 PROCEDURE — J3490M: CUSTOM

## 2022-08-16 PROCEDURE — 20610 DRAIN/INJ JOINT/BURSA W/O US: CPT | Mod: LT

## 2022-08-16 PROCEDURE — 99213 OFFICE O/P EST LOW 20 MIN: CPT | Mod: 25

## 2022-08-18 ENCOUNTER — APPOINTMENT (OUTPATIENT)
Dept: GASTROENTEROLOGY | Facility: CLINIC | Age: 62
End: 2022-08-18

## 2022-08-18 ENCOUNTER — NON-APPOINTMENT (OUTPATIENT)
Age: 62
End: 2022-08-18

## 2022-08-18 DIAGNOSIS — Z01.818 ENCOUNTER FOR OTHER PREPROCEDURAL EXAMINATION: ICD-10-CM

## 2022-08-18 PROCEDURE — 99441: CPT

## 2022-08-19 NOTE — REASON FOR VISIT
[Home] : at home, [unfilled] , at the time of the visit. [Medical Office: ([facility])___] : at the medical office located in  [Patient] : the patient [Follow-Up Visit] : a follow-up visit for [Morbid Obesity (BMI>40)] : morbid obesity (bmi>40)

## 2022-08-22 ENCOUNTER — APPOINTMENT (OUTPATIENT)
Dept: SURGERY | Facility: CLINIC | Age: 62
End: 2022-08-22

## 2022-08-22 VITALS — BODY MASS INDEX: 51.21 KG/M2 | WEIGHT: 280 LBS

## 2022-08-22 PROCEDURE — 99212 OFFICE O/P EST SF 10 MIN: CPT | Mod: 95

## 2022-08-22 NOTE — HISTORY OF PRESENT ILLNESS
[de-identified] : VESNA is a 61 year old female presenting for a monthly weight check prior to laparoscopic sleeve gastrectomy.\par Has psych evaluation and endoscopy scheduled for September.\par No changes in medical history reported.  Continuing to moderate portion sizes and make more healthful choices.  Increasing activity levels as much as possible.

## 2022-08-25 ENCOUNTER — OUTPATIENT (OUTPATIENT)
Dept: OUTPATIENT SERVICES | Facility: HOSPITAL | Age: 62
LOS: 1 days | End: 2022-08-25

## 2022-08-25 VITALS
HEART RATE: 93 BPM | HEIGHT: 63 IN | SYSTOLIC BLOOD PRESSURE: 138 MMHG | WEIGHT: 281.97 LBS | OXYGEN SATURATION: 98 % | RESPIRATION RATE: 14 BRPM | DIASTOLIC BLOOD PRESSURE: 88 MMHG | TEMPERATURE: 99 F

## 2022-08-25 DIAGNOSIS — Z98.890 OTHER SPECIFIED POSTPROCEDURAL STATES: Chronic | ICD-10-CM

## 2022-08-25 DIAGNOSIS — E11.9 TYPE 2 DIABETES MELLITUS WITHOUT COMPLICATIONS: ICD-10-CM

## 2022-08-25 DIAGNOSIS — D21.9 BENIGN NEOPLASM OF CONNECTIVE AND OTHER SOFT TISSUE, UNSPECIFIED: Chronic | ICD-10-CM

## 2022-08-25 DIAGNOSIS — E66.9 OBESITY, UNSPECIFIED: ICD-10-CM

## 2022-08-25 DIAGNOSIS — I10 ESSENTIAL (PRIMARY) HYPERTENSION: ICD-10-CM

## 2022-08-25 DIAGNOSIS — G47.33 OBSTRUCTIVE SLEEP APNEA (ADULT) (PEDIATRIC): ICD-10-CM

## 2022-08-25 LAB
HCT VFR BLD CALC: 40.4 % — SIGNIFICANT CHANGE UP (ref 34.5–45)
HGB BLD-MCNC: 12.7 G/DL — SIGNIFICANT CHANGE UP (ref 11.5–15.5)
MCHC RBC-ENTMCNC: 27.1 PG — SIGNIFICANT CHANGE UP (ref 27–34)
MCHC RBC-ENTMCNC: 31.4 GM/DL — LOW (ref 32–36)
MCV RBC AUTO: 86.1 FL — SIGNIFICANT CHANGE UP (ref 80–100)
NRBC # BLD: 0 /100 WBCS — SIGNIFICANT CHANGE UP (ref 0–0)
NRBC # FLD: 0 K/UL — SIGNIFICANT CHANGE UP (ref 0–0)
PLATELET # BLD AUTO: 270 K/UL — SIGNIFICANT CHANGE UP (ref 150–400)
RBC # BLD: 4.69 M/UL — SIGNIFICANT CHANGE UP (ref 3.8–5.2)
RBC # FLD: 15.4 % — HIGH (ref 10.3–14.5)
WBC # BLD: 8.44 K/UL — SIGNIFICANT CHANGE UP (ref 3.8–10.5)
WBC # FLD AUTO: 8.44 K/UL — SIGNIFICANT CHANGE UP (ref 3.8–10.5)

## 2022-08-25 RX ORDER — AMLODIPINE BESYLATE 2.5 MG/1
1 TABLET ORAL
Qty: 0 | Refills: 0 | DISCHARGE

## 2022-08-25 RX ORDER — METFORMIN HYDROCHLORIDE 850 MG/1
0 TABLET ORAL
Qty: 0 | Refills: 0 | DISCHARGE

## 2022-08-25 RX ORDER — TIZANIDINE 4 MG/1
0 TABLET ORAL
Qty: 0 | Refills: 0 | DISCHARGE

## 2022-08-25 RX ORDER — CHOLECALCIFEROL (VITAMIN D3) 125 MCG
1 CAPSULE ORAL
Qty: 0 | Refills: 0 | DISCHARGE

## 2022-08-25 NOTE — H&P PST ADULT - PROBLEM SELECTOR PLAN 1
Assessment and Plan: Patient scheduled for surgery on 9/13/22  Patient provided with verbal and written presurgical instructions; verbalized understanding  with teach back.    Patient instructed to obtain preop covid pcr, lab directory provided Assessment and Plan: Patient scheduled for surgery on 9/13/22  Patient provided with verbal and written presurgical instructions; verbalized understanding  with teach back.    Patient instructed to obtain preop covid pcr, lab directory provided    OR booking of BMI

## 2022-08-25 NOTE — PROCEDURE
[FreeTextEntry3] : Large Joint Injection was performed in the left knee because of pain and inflammation. \par Decadron: An injection of Decadron 4 mg , \par Kenalog: An injection of Kenalog 5 mg , \par Marcaine: 2 cc. \par \par Patient has tried OTC's including aspirin, Ibuprofen, Aleve etc or prescription NSAIDS, and/or exercises at home and/ or physical therapy without satisfactory response and Patient has decreased mobility in the joint. The risks, benefits, and alternatives to cortisone injection were explained in full to the patient. Risks outlined include but are not limited to infection, sepsis, bleeding, scarring, skin discoloration, temporary increase in pain, syncopal episode, failure to resolve symptoms, allergic reaction, symptom recurrence, and elevation of blood sugar in diabetics. Patient understood the risks. All questions were answered. After discussion of options, patient requested an injection. Oral informed consent was obtained. Sterile technique was utilized for the procedure including the preparation of the solutions used for the injection. Injection site was prepped with betadine and alcohol. Ethyl chloride sprayed topically. Sterile technique used. Patient tolerated procedure well. \par \par Post Procedure Instructions: Patient was advised to call if redness, pain, or fever occur. Apply ice for 15 min. every 2 hours for the next 12-24 hours as tolerated. Patient was advised to rest the joint(s) for 1-2 days.\par \par

## 2022-08-25 NOTE — H&P PST ADULT - CARDIOVASCULAR
normal/regular rate and rhythm/S1 S2 present/no gallops/no rub/no murmur details… normal/regular rate and rhythm/S1 S2 present/no gallops/no rub/no murmur/pedal edema

## 2022-08-25 NOTE — HISTORY OF PRESENT ILLNESS
[de-identified] : Follow up today. Symptoms continue and states low back pain has continued to worsen. Notes tingling in both legs. Difficulty with prolonged sitting/standing. She tried to have MRI performed, but is having difficulty getting referral from her PCP. Left knee pain continues, but not as severe. Notes relief with NSAIDs. Visco approved and now awaiting delivery.

## 2022-08-25 NOTE — DISCUSSION/SUMMARY
[de-identified] : The risks, benefits, and alternatives to corticosteroid injection were reviewed with the patient. Risks outlined include, but are not limited to infection, sepsis, bleeding, scarring, skin discoloration, temporary increase in pain, syncopal episode, failure to resolve symptoms, symptoms recurrence, allergic reaction, flare reaction, and elevation of blood sugar in diabetics. Patient understood the risks and asked to proceed with this treatment course.\par \par Will contact PCP for MRI referral.\par \par Return for visco once delivered.

## 2022-08-25 NOTE — H&P PST ADULT - NSICDXFAMILYHX_GEN_ALL_CORE_FT
FAMILY HISTORY:  FH: hypertension, mother  FH: type 2 diabetes mellitus, , in father, brother    Mother  Still living? No  FH: chronic renal disease, Age at diagnosis: Age Unknown

## 2022-08-25 NOTE — H&P PST ADULT - NSICDXPASTMEDICALHX_GEN_ALL_CORE_FT
PAST MEDICAL HISTORY:  Diabetes     Fibroids     Hypertension     Lumbar herniated disc     Obesity bmi 50-59.9    GABY (obstructive sleep apnea) recent diagnosis has not recieved cpap machine yet    PMB (postmenopausal bleeding)

## 2022-08-25 NOTE — H&P PST ADULT - CORNEAL ABRASION RISK
Speech Language Pathology  Facility/Department: [facility] ICU STEPDOWN   CLINICAL BEDSIDE SWALLOW EVALUATION    NAME: Chacorta Chakraborty  : 1930  MRN: [id number]    ADMISSION DATE: 2022  ADMITTING DIAGNOSIS: has Hypertension; Benign prostatic hyperplasia; Psoriatic arthritis ([address] Primary malignant neoplasm of rectum ([address] Psoriasis; Chronic myeloproliferative disease ([address] Monocytosis (symptomatic); Gastroesophageal reflux disease without esophagitis; Urinary retention; H/O: CVA (cerebrovascular accident); Irregular heart beat; Nonrheumatic aortic valve stenosis; Paroxysmal atrial fibrillation ([address] GI bleed; Melena; Arteriovenous malformation of jejunum; History of rectal cancer; Benign neoplasm of cecum; Benign neoplasm of ascending colon; Pseudomonas urinary tract infection; Partial small bowel obstruction (HCC); SBO (small bowel obstruction) ([address] Atrial fibrillation with RVR ([address] Hypotension; Ischemic colitis ([address] Hypocalcemia; MARIELOS (acute kidney injury) ([address] Probable Bacterial Pneumonia; CAD (coronary artery disease); Anemia; Delirium; Severe protein-calorie malnutrition ([address] On total parenteral nutrition; Colitis; Postoperative intra-abdominal abscess; and Gram-negative bacteremia on their problem list.  ONSET DATE: this admission    Recent Chest Xray/CT of Chest: 22    Date of Eval: 2022  Evaluating Therapist: OLIVA Pugh    IMPRESSIONS AND RECOMMENDATIONS:   Chacorta Chakraborty was referred for a bedside swallow evaluation after being admitted to [facility] with intra- abdominal abscess. Pt received a swallow evaluation on 22 and was placed on easy to chew/ thins. Pt has since had concerns for a CVA (according to RN). Medical hx includes hypertension, GERD, artrial fibrillation, UTI, SOB, MARIELOS, PNA, CAD, dysphagia and delirium. Pt was seen for evaluation seated upright in bed. Pt was lethargic, confused and pleasant. baseline vocal quality is weak.  Oral mechanism exam revealed poor ROM with tongue. Pt was presented with PO trials of thin liquids, nectar thick liquids, and purees. Pt refused any further solid PO trials this date. Oral stage appears mild-moderately impaired characterized by slow A-P transfer and suspected premature bolus loss. Pharyngeal phase appears mildly-moderately impaired characterized by decreased laryngeal elevation, and m,ultiple swallows per liquid bolus. Voice quality changed to weak and wet after (1/2) liquid trials. No overt s/s of aspiration were noted with nectar thick liquid trials. Recommend pureed diet with nectar thick liquids. Recommend strict aspiration precautions including upright positioning, slow pacing, alternating solids and liquids, and small bites and sips. Recommend pt be a TOTAL ASSIST feed. ST will continue to follow 2-3x a week. Pt and RN educated on results/recommendations.        Current Diet level:  Current Diet : Easy to chew      Primary Complaint  Patient Complaint: fatigue, reduced appetite    Pain:  Pain Assessment  Pain Assessment: None - Denies Pain  Pain Level: 0  Martínez-Baker Pain Rating: No hurt  Patient's Stated Pain Goal: Unable to verbalize/indicate pain goal  Pain Location: Neck  Pain Orientation: Mid,Lower  Pain Descriptors: Patient unable to describe  Functional Pain Assessment: Prevents or interferes with all active and some passive activities  Pain Type: Acute pain  Pain Frequency: Continuous  Pain Onset: On-going  Non-Pharmaceutical Pain Intervention(s): Repositioned,Rest,Food,Ice,Emotional support,Prayer  Response to Pain Intervention: Pain improved but above pain goal  Side Effects: No reported side effects (rr >10)  Multiple Pain Sites: No  Pain Assessment in Advanced Dementia (PAINAD)  Non-Pharmaceutical Pain Intervention(s): Repositioned,Rest,Food,Ice,Emotional support,Prayer  Response to Pain Intervention: Pain improved but above pain goal  Side Effects: No reported side effects (rr >10)  Faces, Legs, Activity, Cry, and Consolability (FLACC)  Non-Pharmaceutical Pain Intervention(s): Repositioned,Rest,Food,Ice,Emotional support,Prayer  Response to Pain Intervention: Pain improved but above pain goal  Side Effects: No reported side effects (rr >10)    Reason for Referral  Lawson Lozoya was referred for a bedside swallow evaluation to assess the efficiency of his swallow function, identify signs and symptoms of aspiration and make recommendations regarding safe dietary consistencies, effective compensatory strategies, and safe eating environment. Impression  Dysphagia Diagnosis: Mild to moderate oral stage dysphagia;Mild to moderate pharyngeal stage dysphagia  Dysphagia Outcome Severity Scale: Level 3: Moderate dysphagia- Total assisstance, supervision or strategies. Two or more diet consistencies restricted     Treatment Plan  Requires SLP Intervention: Yes  Duration of Treatment: LOS  D/C Recommendations: To be determined; Ongoing speech therapy is recommended during this hospitalization       Recommended Diet and Intervention        Recommended Form of Meds: Meds in puree  Recommendations: Total feed  Therapeutic Interventions: Diet tolerance monitoring;Patient/Family education; Other (comment); Therapeutic PO trials with SLP    Compensatory Swallowing Strategies  Compensatory Swallowing Strategies : Upright as possible for all oral intake;Remain upright for 30-45 minutes after meals;Eat/Feed slowly; Alternate solids and liquids;Small bites/sips;Assist feed; Total feed    Treatment/Goals  Short-term Goals  Goal 1: Pt will tolerate pureed diet with nectar thick liquids with adequate oral manipulation/clearance and no s/s aspiration. Goal 2: Pt/caregivers will implement recommended strategies 90% given min cues. Goal 3: Pt will participate in instrumental swallow evaluation as indicated. Goal 4: Pt/caregivers will demonstrate understanding of education/recommendations.     General  Chart Reviewed: Yes  Comments: AMS, Abcess, Possible CVA  Behavior/Cognition: Cooperative;Confused;Pleasant mood; Lethargic  Temperature Spikes Noted: No  Respiratory Status: Room air  O2 Device: None (Room air)  Follows Directions: Simple  Dentition: Edentulous  Patient Positioning: Upright in bed  Baseline Vocal Quality: Weak  Volitional Swallow: Delayed  Prior Dysphagia History: yes, two days ago, thins and easy to chew  Consistencies Administered: Pureed;Mildly Thick - straw; Thin - straw (pt refused further solids trials)           Vision/Hearing  Hearing  Hearing: Within functional limits    Oral Motor Deficits  Oral/Motor  Oral Hygiene: Moist    Oral Phase Dysfunction  Oral Phase  Oral Phase: Exceptions (reduced A-P transfer)  Oral Phase  Oral Phase - Comment: moderatley impaired     Indicators of Pharyngeal Phase Dysfunction   Pharyngeal Phase   Pharyngeal Phase: moderatley impaired    Prognosis  Individuals consulted  Consulted and agree with results and recommendations: RN;Patient  RN Name: Winsome Concepcion  Patient Education: results, recommendations  Patient Education Response: Needs reinforcement  Safety Devices in place: Yes  Type of devices: Left in bed       Therapy Time  SLP Individual Minutes  Time In: 0815  Time Out: 0845  Minutes: OLIVA Becker  5/7/2022 8:54 AM denies

## 2022-08-25 NOTE — H&P PST ADULT - RX
2018 study in chart, stopped using cpap when recalled, new study indicating cpap, ordered by provider

## 2022-08-25 NOTE — PHYSICAL EXAM
[Bending to right] : bending to right [Left] : left knee [5___] : hamstring 5[unfilled]/5 [] : no erythema [TWNoteComboBox7] : flexion 120 degrees [de-identified] : extension 3 degrees

## 2022-08-25 NOTE — HISTORY OF PRESENT ILLNESS
[de-identified] : Follow up today. Symptoms continue and states low back pain has continued to worsen. Notes tingling in both legs. Difficulty with prolonged sitting/standing. She tried to have MRI performed, but is having difficulty getting referral from her PCP. Left knee pain continues, but not as severe. Notes relief with NSAIDs. Visco approved and now awaiting delivery.

## 2022-08-25 NOTE — H&P PST ADULT - PROBLEM SELECTOR PLAN 4
Assessment and Plan: Patient instructed to take amlodipine on day of procedure, verbalized understanding.  Patient instructed to hold HCTZ on DOS, verbalized understanding

## 2022-08-25 NOTE — H&P PST ADULT - PROBLEM SELECTOR PLAN 2
Problem: Diabetes  Assessment and Plan: Patient instructed on medications adjustments: hold metformin DOS   POCT glucose testing upon admission

## 2022-08-25 NOTE — DISCUSSION/SUMMARY
[de-identified] : The risks, benefits, and alternatives to corticosteroid injection were reviewed with the patient. Risks outlined include, but are not limited to infection, sepsis, bleeding, scarring, skin discoloration, temporary increase in pain, syncopal episode, failure to resolve symptoms, symptoms recurrence, allergic reaction, flare reaction, and elevation of blood sugar in diabetics. Patient understood the risks and asked to proceed with this treatment course.\par \par Will contact PCP for MRI referral.\par \par Return for visco once delivered.

## 2022-08-25 NOTE — H&P PST ADULT - HISTORY OF PRESENT ILLNESS
61 yr old female presents with body mass index 50-59.9 scheduled for EGD for workup for bariatric surgery

## 2022-08-29 PROBLEM — D21.9 BENIGN NEOPLASM OF CONNECTIVE AND OTHER SOFT TISSUE, UNSPECIFIED: Chronic | Status: ACTIVE | Noted: 2022-08-25

## 2022-08-29 PROBLEM — G47.33 OBSTRUCTIVE SLEEP APNEA (ADULT) (PEDIATRIC): Chronic | Status: ACTIVE | Noted: 2022-08-25

## 2022-08-29 PROBLEM — E66.9 OBESITY, UNSPECIFIED: Chronic | Status: ACTIVE | Noted: 2020-12-04

## 2022-08-29 PROBLEM — M51.26 OTHER INTERVERTEBRAL DISC DISPLACEMENT, LUMBAR REGION: Chronic | Status: ACTIVE | Noted: 2022-08-25

## 2022-09-06 ENCOUNTER — APPOINTMENT (OUTPATIENT)
Dept: ORTHOPEDIC SURGERY | Facility: CLINIC | Age: 62
End: 2022-09-06

## 2022-09-06 DIAGNOSIS — M51.36 OTHER INTERVERTEBRAL DISC DEGENERATION, LUMBAR REGION: ICD-10-CM

## 2022-09-06 PROCEDURE — 99213 OFFICE O/P EST LOW 20 MIN: CPT | Mod: 25

## 2022-09-06 PROCEDURE — 20611 DRAIN/INJ JOINT/BURSA W/US: CPT | Mod: 50

## 2022-09-06 NOTE — DATA REVIEWED
[FreeTextEntry1] : MRI of the lumbar spine was reviewed and independently interpreted by me:\par 1. Mild degenerative changes across the lumbar spine with mild anterolisthesis of L4 and L5 and multilevel broad-based disc bulges.\par 2. Bilateral neuroforaminal narrowing at L4/L5 with mass effect on the exiting L4 nerve roots and left neuroforaminal narrowing at L5/S1 with mass effect on the exiting left L5 nerve root.\par 3. Multilevel facet arthropathy

## 2022-09-06 NOTE — HISTORY OF PRESENT ILLNESS
[de-identified] : Follow up today. Symptoms continue, but she does note some relief following knee CSI at the last visit. She has been approved to start bilateral Euflexxa injections and lumbar MRI was performed.

## 2022-09-06 NOTE — DISCUSSION/SUMMARY
[de-identified] : The patient was advised of the diagnosis. The natural history of the pathology was explained in full to the patient in layman's terms. All questions were answered. The risks and benefits of surgical and non-surgical treatment alternatives were explained in full to the patient.\par \par The risks, benefits, and alternatives to viscosupplementation injection were reviewed with the patient. Risks outlined include but are not limited to infection, sepsis, bleeding, scarring, temporary increase in pain, syncopal episode, failure to resolve symptoms, symptoms recurrence, allergic reaction, flare reaction, pseudoseptic reaction. Patient understood the risks and asked to proceed with this treatment course.\par \par Back advised active exercises , to consider PT and to be seen by pain mangement\par

## 2022-09-06 NOTE — PHYSICAL EXAM
[Bending to right] : bending to right [Left] : left knee [5___] : hamstring 5[unfilled]/5 [] : no erythema [TWNoteComboBox7] : flexion 120 degrees [de-identified] : extension 3 degrees

## 2022-09-06 NOTE — PROCEDURE
[FreeTextEntry3] : Viscosupplementation Injection: X-ray evidence of osteoarthritis on this or prior visit and patient has tried OTC's including aspirin, Ibuprofen, Aleve etc or prescription NSAIDS, and/or exercises at home and/ or physical therapy without satisfactory response. \par \par An injection of Euflexxa 2.5ml #1 was injected into the bilateral knee(s) after verbal consent obtained. \par \par Patient has tried OTC's including aspirin, Ibuprofen, Aleve etc or prescription NSAIDS, and/or exercises at home and/ or physical therapy without satisfactory response and Patient has decreased mobility in the joint. The risks, benefits, and alternatives to cortisone injection were explained in full to the patient. Risks outlined include but are not limited to infection, sepsis, bleeding, scarring, skin discoloration, temporary increase in pain, syncopal episode, failure to resolve symptoms, allergic reaction, and symptom recurrence. Patient understands the risks. All questions were answered. After discussion of options, patient requested an injection. Oral informed consent was obtained. Sterile technique was utilized for the procedure including the preparation of the solutions used for the injection. Injection site was prepped with betadine and alcohol. Ethyl chloride sprayed topically. Sterile technique used. Patient tolerated procedure well. \par \par Post Procedure Instructions: Patient was advised to call if redness, pain, or fever occur. Apply ice for 15 min. every 2 hours for the next 12-24 hours as tolerated. Patient was advised to rest the joint(s) for 1-2 days.\par

## 2022-09-14 ENCOUNTER — APPOINTMENT (OUTPATIENT)
Dept: ORTHOPEDIC SURGERY | Facility: CLINIC | Age: 62
End: 2022-09-14
Payer: COMMERCIAL

## 2022-09-14 VITALS — HEIGHT: 62 IN | WEIGHT: 280 LBS | BODY MASS INDEX: 51.53 KG/M2

## 2022-09-14 PROCEDURE — 20610 DRAIN/INJ JOINT/BURSA W/O US: CPT | Mod: 50

## 2022-09-14 PROCEDURE — 99024 POSTOP FOLLOW-UP VISIT: CPT

## 2022-09-14 NOTE — HISTORY OF PRESENT ILLNESS
[2] : 2 [Euflexxa] : Euflexxa [de-identified] : Euflexxa #2- bilateral knees. Symptoms continue, but does note improvement following last injection. Denies complication with prior injection. [] : no [FreeTextEntry1] : bilateral knees [TWNoteComboBox1] : 80%

## 2022-09-14 NOTE — PHYSICAL EXAM
[Bending to right] : bending to right [Left] : left knee [5___] : hamstring 5[unfilled]/5 [] : light touch is intact throughout [TWNoteComboBox7] : flexion 120 degrees [de-identified] : extension 3 degrees

## 2022-09-19 ENCOUNTER — APPOINTMENT (OUTPATIENT)
Dept: SURGERY | Facility: CLINIC | Age: 62
End: 2022-09-19

## 2022-09-21 ENCOUNTER — NON-APPOINTMENT (OUTPATIENT)
Age: 62
End: 2022-09-21

## 2022-09-21 ENCOUNTER — APPOINTMENT (OUTPATIENT)
Dept: ORTHOPEDIC SURGERY | Facility: CLINIC | Age: 62
End: 2022-09-21

## 2022-09-21 VITALS — BODY MASS INDEX: 51.53 KG/M2 | HEIGHT: 62 IN | WEIGHT: 280 LBS

## 2022-09-21 DIAGNOSIS — M17.11 UNILATERAL PRIMARY OSTEOARTHRITIS, RIGHT KNEE: ICD-10-CM

## 2022-09-21 DIAGNOSIS — M17.12 UNILATERAL PRIMARY OSTEOARTHRITIS, LEFT KNEE: ICD-10-CM

## 2022-09-21 LAB — SARS-COV-2 N GENE NPH QL NAA+PROBE: NOT DETECTED

## 2022-09-21 PROCEDURE — 20610 DRAIN/INJ JOINT/BURSA W/O US: CPT | Mod: 50

## 2022-09-21 NOTE — PHYSICAL EXAM
[Bending to right] : bending to right [Left] : left knee [5___] : hamstring 5[unfilled]/5 [] : no erythema [TWNoteComboBox7] : flexion 120 degrees [de-identified] : extension 3 degrees

## 2022-09-21 NOTE — HISTORY OF PRESENT ILLNESS
[3] : 3 [Euflexxa] : Euflexxa [de-identified] : Euflexxa #3- bilateral knees. Symptoms continue, but does note improvement following last injection. Denies complication with prior injection. [] : no [FreeTextEntry1] : both knees [de-identified] : 9/14/22 [TWNoteComboBox1] : 80%

## 2022-09-21 NOTE — REASON FOR VISIT
[FreeTextEntry2] : Patient is here for a Follow Up appointment to receive Euflexxa Injection #3 in Both Knees.

## 2022-09-21 NOTE — PROCEDURE
[FreeTextEntry3] : Viscosupplementation Injection: X-ray evidence of osteoarthritis on this or prior visit and patient has tried OTC's including aspirin, Ibuprofen, Aleve etc or prescription NSAIDS, and/or exercises at home and/ or physical therapy without satisfactory response. \par \par An injection of Euflexxa 2.5ml #3 was injected into the bilateral knee(s) after verbal consent obtained. \par \par Patient has tried OTC's including aspirin, Ibuprofen, Aleve etc or prescription NSAIDS, and/or exercises at home and/ or physical therapy without satisfactory response and Patient has decreased mobility in the joint. The risks, benefits, and alternatives to cortisone injection were explained in full to the patient. Risks outlined include but are not limited to infection, sepsis, bleeding, scarring, skin discoloration, temporary increase in pain, syncopal episode, failure to resolve symptoms, allergic reaction, and symptom recurrence. Patient understands the risks. All questions were answered. After discussion of options, patient requested an injection. Oral informed consent was obtained. Sterile technique was utilized for the procedure including the preparation of the solutions used for the injection. Injection site was prepped with betadine and alcohol. Ethyl chloride sprayed topically. Sterile technique used. Patient tolerated procedure well. \par \par Post Procedure Instructions: Patient was advised to call if redness, pain, or fever occur. Apply ice for 15 min. every 2 hours for the next 12-24 hours as tolerated. Patient was advised to rest the joint(s) for 1-2 days.\par

## 2022-09-23 ENCOUNTER — APPOINTMENT (OUTPATIENT)
Dept: GASTROENTEROLOGY | Facility: HOSPITAL | Age: 62
End: 2022-09-23

## 2022-09-23 ENCOUNTER — RESULT REVIEW (OUTPATIENT)
Age: 62
End: 2022-09-23

## 2022-09-23 ENCOUNTER — OUTPATIENT (OUTPATIENT)
Dept: OUTPATIENT SERVICES | Facility: HOSPITAL | Age: 62
LOS: 1 days | Discharge: HOME | End: 2022-09-23

## 2022-09-23 VITALS
OXYGEN SATURATION: 95 % | HEART RATE: 70 BPM | SYSTOLIC BLOOD PRESSURE: 160 MMHG | DIASTOLIC BLOOD PRESSURE: 99 MMHG | RESPIRATION RATE: 20 BRPM

## 2022-09-23 VITALS
SYSTOLIC BLOOD PRESSURE: 149 MMHG | HEART RATE: 89 BPM | RESPIRATION RATE: 20 BRPM | OXYGEN SATURATION: 99 % | DIASTOLIC BLOOD PRESSURE: 74 MMHG

## 2022-09-23 DIAGNOSIS — Z98.890 OTHER SPECIFIED POSTPROCEDURAL STATES: Chronic | ICD-10-CM

## 2022-09-23 LAB — GLUCOSE BLDC GLUCOMTR-MCNC: 94 MG/DL — SIGNIFICANT CHANGE UP (ref 70–99)

## 2022-09-23 PROCEDURE — 88305 TISSUE EXAM BY PATHOLOGIST: CPT | Mod: 26

## 2022-09-23 PROCEDURE — 43239 EGD BIOPSY SINGLE/MULTIPLE: CPT

## 2022-09-23 NOTE — ASU PATIENT PROFILE, ADULT - FALL HARM RISK - UNIVERSAL INTERVENTIONS
Bed in lowest position, wheels locked, appropriate side rails in place/Call bell, personal items and telephone in reach/Instruct patient to call for assistance before getting out of bed or chair/Non-slip footwear when patient is out of bed/[place] to call system/Physically safe environment - no spills, clutter or unnecessary equipment/Purposeful Proactive Rounding/Room/bathroom lighting operational, light cord in reach

## 2022-09-27 LAB — SURGICAL PATHOLOGY STUDY: SIGNIFICANT CHANGE UP

## 2022-09-28 ENCOUNTER — APPOINTMENT (OUTPATIENT)
Dept: PAIN MANAGEMENT | Facility: CLINIC | Age: 62
End: 2022-09-28

## 2022-10-05 ENCOUNTER — APPOINTMENT (OUTPATIENT)
Dept: GASTROENTEROLOGY | Facility: CLINIC | Age: 62
End: 2022-10-05

## 2022-10-07 ENCOUNTER — NON-APPOINTMENT (OUTPATIENT)
Age: 62
End: 2022-10-07

## 2022-10-07 NOTE — REASON FOR VISIT
[Home] : at home, [unfilled] , at the time of the visit. [Medical Office: ([facility])___] : at the medical office located in  [Patient] : the patient [Follow-Up Visit] : a follow-up visit for

## 2022-10-10 ENCOUNTER — APPOINTMENT (OUTPATIENT)
Dept: SURGERY | Facility: CLINIC | Age: 62
End: 2022-10-10

## 2022-10-10 VITALS — HEIGHT: 62 IN | WEIGHT: 277 LBS | BODY MASS INDEX: 50.97 KG/M2

## 2022-10-10 DIAGNOSIS — A04.8 OTHER SPECIFIED BACTERIAL INTESTINAL INFECTIONS: ICD-10-CM

## 2022-10-10 PROCEDURE — 99212 OFFICE O/P EST SF 10 MIN: CPT | Mod: 95

## 2022-10-10 NOTE — HISTORY OF PRESENT ILLNESS
[de-identified] : Jasmine a 61 y/o female here for her monthly weight prior to bariatric surgery. \par No changes in medical history reported.  Continuing to moderate portion sizes and make more healthful choices.  Increasing activity levels as much as possible. \par Lost 3 lbs.\par Completed endoscopy, found to be H. pylori positive, is undergoing treatment.\par

## 2022-10-10 NOTE — ASSESSMENT
[FreeTextEntry1] : 62-year-old female with longstanding history of morbid obesity (BMI 50) with comorbidities of hypertension and type 2 diabetes presents for continuing medical weight management in preparation for laparoscopic sleeve gastrectomy.\par

## 2022-10-19 RX ORDER — OMEPRAZOLE MAGNESIUM, AMOXICILLIN AND RIFABUTIN 10; 250; 12.5 MG/1; MG/1; MG/1
250-12.5-1 CAPSULE, DELAYED RELEASE ORAL EVERY 8 HOURS
Qty: 1 | Refills: 0 | Status: DISCONTINUED | COMMUNITY
Start: 2022-10-10 | End: 2022-10-19

## 2022-11-14 ENCOUNTER — APPOINTMENT (OUTPATIENT)
Dept: SURGERY | Facility: CLINIC | Age: 62
End: 2022-11-14

## 2022-11-14 VITALS — WEIGHT: 277 LBS | BODY MASS INDEX: 50.66 KG/M2

## 2022-11-14 PROCEDURE — 99213 OFFICE O/P EST LOW 20 MIN: CPT | Mod: 95

## 2022-11-14 NOTE — ASSESSMENT
[FreeTextEntry1] : 62-year-old female with longstanding history of morbid obesity (BMI 50) with comorbidities of hypertension and type 2 diabetes presents for continuing medical weight management in preparation for laparoscopic sleeve gastrectomy with hiatal hernia repair.\par

## 2022-11-14 NOTE — HISTORY OF PRESENT ILLNESS
[Home] : at home, [unfilled] , at the time of the visit. [Medical Office: ([facility])___] : at the medical office located in  [Verbal consent obtained from patient] : the patient, [unfilled] [de-identified] : Pt. presents for monthly weight in visit in preparation for Bariatric Surgery.\par No changes in medical history reported.  Continuing to moderate portion sizes and make more healthful choices.  Increasing activity levels as much as possible. \par

## 2022-11-29 ENCOUNTER — LABORATORY RESULT (OUTPATIENT)
Age: 62
End: 2022-11-29

## 2023-03-17 NOTE — H&P PST ADULT - CIGARETTES, NUMBER OF YRS
Problem: Pain  Goal: #Acceptable pain level achieved/maintained at rest using NRS/Faces  Description: This goal is used for patients who can self-report.  Acceptable means the level is at or below the identified comfort/function goal.  Outcome: Outcome Met, Continue evaluating goal progress toward completion  Goal: # Acceptable pain level achieved/maintained at rest using NRS/Faces without oversedation (opioid naive or PCA/Epidural infusion)  Description: This goal is used if Opioid-naïve or on PCA/Epidural Infusion.  Outcome: Outcome Met, Continue evaluating goal progress toward completion  Goal: # Acceptable pain level achieved/maintained with activity using NRS/Faces  Description: This goal is used for patients who can self-report and are not achieving acceptable pain control during activity.  Outcome: Outcome Met, Continue evaluating goal progress toward completion  Goal: Acceptable pain/comfort level is achieved/maintained at rest (based on Pain Behaviors Scale)  Description: This goal is used for patients who are not able to self-report pain and are assessed for pain using the Pain Behaviors Scale  Outcome: Outcome Met, Continue evaluating goal progress toward completion  Goal: Acceptable pain/comfort level is achieved/maintained at rest based on PAINAID scale (Dementia)  Description: This goal is used for patients who are not able to self-report pain, have dementia, and assessed using the PAINAD scale.  Outcome: Outcome Met, Continue evaluating goal progress toward completion  Goal: Acceptable pain/comfort level is achieved/maintained at rest (based on pediatric behavior tool: NIPS, NPASS, or FLACC)  Description: This goal is used for pediatric patients who are not able to self report pain.  Outcome: Outcome Met, Continue evaluating goal progress toward completion  Goal: # Verbalizes understanding of pain management  Description: Documented in Patient Education Activity  Outcome: Outcome Met, Continue  evaluating goal progress toward completion  Goal: Verbalizes understanding and effective use of Patient Controlled Analgesia (PCA)  Description: Documented in Patient Education Activity  This goal is used for patients with PCA  Outcome: Outcome Met, Continue evaluating goal progress toward completion  Goal: Maximum comfort achieved/maintained at end of life (Hospice)  Outcome: Outcome Met, Continue evaluating goal progress toward completion     Problem: VTE, Risk for  Goal: # No s/s of VTE  Outcome: Outcome Met, Continue evaluating goal progress toward completion  Goal: # Verbalizes understanding of VTE risk factors and prevention  Description: Document education using the patient education activity.   Outcome: Outcome Met, Continue evaluating goal progress toward completion  Goal: Demonstrates ability to administer injectable anticoagulants if ordered for d/c  Description: Document education using the patient education activity.  Outcome: Outcome Met, Continue evaluating goal progress toward completion      10

## 2023-05-01 ENCOUNTER — APPOINTMENT (OUTPATIENT)
Dept: CARDIOLOGY | Facility: CLINIC | Age: 63
End: 2023-05-01

## 2023-06-08 ENCOUNTER — OUTPATIENT (OUTPATIENT)
Dept: OUTPATIENT SERVICES | Facility: HOSPITAL | Age: 63
LOS: 1 days | End: 2023-06-08
Payer: COMMERCIAL

## 2023-06-08 VITALS
RESPIRATION RATE: 16 BRPM | DIASTOLIC BLOOD PRESSURE: 88 MMHG | TEMPERATURE: 98 F | OXYGEN SATURATION: 99 % | HEART RATE: 80 BPM | SYSTOLIC BLOOD PRESSURE: 134 MMHG | HEIGHT: 64.17 IN | WEIGHT: 283.07 LBS

## 2023-06-08 DIAGNOSIS — E66.01 MORBID (SEVERE) OBESITY DUE TO EXCESS CALORIES: ICD-10-CM

## 2023-06-08 DIAGNOSIS — Z01.818 ENCOUNTER FOR OTHER PREPROCEDURAL EXAMINATION: ICD-10-CM

## 2023-06-08 DIAGNOSIS — Z98.890 OTHER SPECIFIED POSTPROCEDURAL STATES: Chronic | ICD-10-CM

## 2023-06-08 DIAGNOSIS — Z29.9 ENCOUNTER FOR PROPHYLACTIC MEASURES, UNSPECIFIED: ICD-10-CM

## 2023-06-08 DIAGNOSIS — E11.9 TYPE 2 DIABETES MELLITUS WITHOUT COMPLICATIONS: ICD-10-CM

## 2023-06-08 LAB
A1C WITH ESTIMATED AVERAGE GLUCOSE RESULT: 7.1 % — HIGH (ref 4–5.6)
ALBUMIN SERPL ELPH-MCNC: 4.1 G/DL — SIGNIFICANT CHANGE UP (ref 3.3–5)
ALP SERPL-CCNC: 78 U/L — SIGNIFICANT CHANGE UP (ref 40–120)
ALT FLD-CCNC: 25 U/L — SIGNIFICANT CHANGE UP (ref 10–45)
ANION GAP SERPL CALC-SCNC: 11 MMOL/L — SIGNIFICANT CHANGE UP (ref 5–17)
AST SERPL-CCNC: 29 U/L — SIGNIFICANT CHANGE UP (ref 10–40)
BILIRUB SERPL-MCNC: 0.4 MG/DL — SIGNIFICANT CHANGE UP (ref 0.2–1.2)
BLD GP AB SCN SERPL QL: NEGATIVE — SIGNIFICANT CHANGE UP
BUN SERPL-MCNC: 12 MG/DL — SIGNIFICANT CHANGE UP (ref 7–23)
CALCIUM SERPL-MCNC: 10 MG/DL — SIGNIFICANT CHANGE UP (ref 8.4–10.5)
CHLORIDE SERPL-SCNC: 102 MMOL/L — SIGNIFICANT CHANGE UP (ref 96–108)
CO2 SERPL-SCNC: 26 MMOL/L — SIGNIFICANT CHANGE UP (ref 22–31)
CREAT SERPL-MCNC: 0.79 MG/DL — SIGNIFICANT CHANGE UP (ref 0.5–1.3)
EGFR: 84 ML/MIN/1.73M2 — SIGNIFICANT CHANGE UP
ESTIMATED AVERAGE GLUCOSE: 157 MG/DL — HIGH (ref 68–114)
GLUCOSE SERPL-MCNC: 132 MG/DL — HIGH (ref 70–99)
HCT VFR BLD CALC: 39.4 % — SIGNIFICANT CHANGE UP (ref 34.5–45)
HGB BLD-MCNC: 12.5 G/DL — SIGNIFICANT CHANGE UP (ref 11.5–15.5)
MCHC RBC-ENTMCNC: 26.9 PG — LOW (ref 27–34)
MCHC RBC-ENTMCNC: 31.7 GM/DL — LOW (ref 32–36)
MCV RBC AUTO: 84.7 FL — SIGNIFICANT CHANGE UP (ref 80–100)
NRBC # BLD: 0 /100 WBCS — SIGNIFICANT CHANGE UP (ref 0–0)
PLATELET # BLD AUTO: 245 K/UL — SIGNIFICANT CHANGE UP (ref 150–400)
POTASSIUM SERPL-MCNC: 3.8 MMOL/L — SIGNIFICANT CHANGE UP (ref 3.5–5.3)
POTASSIUM SERPL-SCNC: 3.8 MMOL/L — SIGNIFICANT CHANGE UP (ref 3.5–5.3)
PROT SERPL-MCNC: 7.8 G/DL — SIGNIFICANT CHANGE UP (ref 6–8.3)
RBC # BLD: 4.65 M/UL — SIGNIFICANT CHANGE UP (ref 3.8–5.2)
RBC # FLD: 14.7 % — HIGH (ref 10.3–14.5)
RH IG SCN BLD-IMP: POSITIVE — SIGNIFICANT CHANGE UP
SODIUM SERPL-SCNC: 139 MMOL/L — SIGNIFICANT CHANGE UP (ref 135–145)
WBC # BLD: 5.62 K/UL — SIGNIFICANT CHANGE UP (ref 3.8–10.5)
WBC # FLD AUTO: 5.62 K/UL — SIGNIFICANT CHANGE UP (ref 3.8–10.5)

## 2023-06-08 PROCEDURE — G0463: CPT

## 2023-06-08 PROCEDURE — 80053 COMPREHEN METABOLIC PANEL: CPT

## 2023-06-08 PROCEDURE — 86901 BLOOD TYPING SEROLOGIC RH(D): CPT

## 2023-06-08 PROCEDURE — 86850 RBC ANTIBODY SCREEN: CPT

## 2023-06-08 PROCEDURE — 86900 BLOOD TYPING SEROLOGIC ABO: CPT

## 2023-06-08 PROCEDURE — 83036 HEMOGLOBIN GLYCOSYLATED A1C: CPT

## 2023-06-08 PROCEDURE — 85027 COMPLETE CBC AUTOMATED: CPT

## 2023-06-08 NOTE — H&P PST ADULT - HISTORY OF PRESENT ILLNESS
63 y/o F with PMHx Morbid Obesity, GABY on CPAP, T2DM, HTN, HLD, presents to [facility] for pre-procedure evaluation. Patient reports has had difficulties losing weight after numerous attempts. She is scheduled for Laparoscopic Sleeve Gastrectomy; Possible Hiatal Hernia Repair with Dr. Grover on 06/29/2023.

## 2023-06-08 NOTE — H&P PST ADULT - NSICDXPASTMEDICALHX_GEN_ALL_CORE_FT
PAST MEDICAL HISTORY:  Fibroids     Hiatal hernia     Hypertension     Lumbar herniated disc     Obesity bmi 50-59.9    GABY (obstructive sleep apnea) recent diagnosis has not recieved cpap machine yet    T2DM (type 2 diabetes mellitus)     Vertigo

## 2023-06-08 NOTE — H&P PST ADULT - PROBLEM SELECTOR PLAN 1
Scheduled for Laparoscopic Sleeve Gastrectomy   Labs: cbc, cmp, a1c, t&s   Dos: fingerstick, Abo    PST instructions provided. Surgical soap given to patient. Patient verbalized understanding   Incentive spirometer given to patient, instructions provided.

## 2023-06-08 NOTE — H&P PST ADULT - ASSESSMENT
DASI score: 6.05 METS   DASI activity: Walking, can climb 1-2 flights of stairs, denies any chest pain or dyspnea   Loose teeth or denture: no loose teeth or dentures     Mallampati: Class 3     CAPRINI SCORE [CLOT]    AGE RELATED RISK FACTORS                                                       MOBILITY RELATED FACTORS  [ ] Age 41-60 years                                            (1 Point)                  [ ] Bed rest                                                        (1 Point)  [X ] Age: 61-74 years                                           (2 Points)                 [ ] Plaster cast                                                   (2 Points)  [ ] Age= 75 years                                              (3 Points)                 [ ] Bed bound for more than 72 hours                 (2 Points)    DISEASE RELATED RISK FACTORS                                               GENDER SPECIFIC FACTORS  [X ] Edema in the lower extremities                       (1 Point)                  [ ] Pregnancy                                                     (1 Point)  [ ] Varicose veins                                               (1 Point)                  [ ] Post-partum < 6 weeks                                   (1 Point)             [X ] BMI > 25 Kg/m2                                            (1 Point)                  [ ] Hormonal therapy  or oral contraception          (1 Point)                 [ ] Sepsis (in the previous month)                        (1 Point)                  [ ] History of pregnancy complications                 (1 point)  [ ] Pneumonia or serious lung disease                                               [ ] Unexplained or recurrent                     (1 Point)           (in the previous month)                               (1 Point)  [ ] Abnormal pulmonary function test                     (1 Point)                 SURGERY RELATED RISK FACTORS  [ ] Acute myocardial infarction                              (1 Point)                 [ ]  Section                                             (1 Point)  [ ] Congestive heart failure (in the previous month)  (1 Point)               [ ] Minor surgery                                                  (1 Point)   [ ] Inflammatory bowel disease                             (1 Point)                 [ ] Arthroscopic surgery                                        (2 Points)  [ ] Central venous access                                      (2 Points)                [X ] General surgery lasting more than 45 minutes   (2 Points)       [ ] Stroke (in the previous month)                          (5 Points)               [ ] Elective arthroplasty                                         (5 Points)                                                                                                                                               HEMATOLOGY RELATED FACTORS                                                 TRAUMA RELATED RISK FACTORS  [ ] Prior episodes of VTE                                     (3 Points)                [ ] Fracture of the hip, pelvis, or leg                       (5 Points)  [ ] Positive family history for VTE                         (3 Points)                 [ ] Acute spinal cord injury (in the previous month)  (5 Points)  [ ] Prothrombin 93561 A                                     (3 Points)                 [ ] Paralysis  (less than 1 month)                             (5 Points)  [ ] Factor V Leiden                                             (3 Points)                  [ ] Multiple Trauma within 1 month                        (5 Points)  [ ] Lupus anticoagulants                                     (3 Points)                                                           [ ] Anticardiolipin antibodies                               (3 Points)                                                       [ ] High homocysteine in the blood                      (3 Points)                                             [ ] Other congenital or acquired thrombophilia      (3 Points)                                                [ ] Heparin induced thrombocytopenia                  (3 Points)                                          Total Score [      6    ]    Caprini Score 0 - 2:  Low Risk, No VTE Prophylaxis required for most patients, encourage ambulation  Caprini Score 3 - 6:  At Risk, pharmacologic VTE prophylaxis is indicated for most patients (in the absence of a contraindication)  Caprini Score Greater than or = 7:  High Risk, pharmacologic VTE prophylaxis is indicated for most patients (in the absence of a contraindication)

## 2023-06-08 NOTE — H&P PST ADULT - TEMPERATURE IN FAHRENHEIT (DEGREES F)
97.8 O-Z Plasty Text: The defect edges were debeveled with a #15 scalpel blade.  Given the location of the defect, shape of the defect and the proximity to free margins an O-Z plasty (double transposition flap) was deemed most appropriate.  Using a sterile surgical marker, the appropriate transposition flaps were drawn incorporating the defect and placing the expected incisions within the relaxed skin tension lines where possible.    The area thus outlined was incised deep to adipose tissue with a #15 scalpel blade.  The skin margins were undermined to an appropriate distance in all directions utilizing iris scissors.  Hemostasis was achieved with electrocautery.  The flaps were then transposed into place, one clockwise and the other counterclockwise, and anchored with interrupted buried subcutaneous sutures.

## 2023-06-10 NOTE — PHARMACOTHERAPY INTERVENTION NOTE - COMMENTS
Initial Pharmacy Review  HPI:  Patient is a 61 yo F with PMHx of Morbid Obesity, GABY on CPAP, T2DM, HTN, and HLD currently scheduled for Laparoscopic Sleeve Gastrectomy and possible Hiatal Hernia Repair with Dr. Grover on 06/29/2023.     Home Medications:  atorvastatin 20 mg oral tablet: 1 orally once a day  hydroCHLOROthiazide 25 mg oral tablet: 1 orally once a day  metFORMIN 500 mg oral tablet: 1 orally once a day  Norvasc 5 mg oral tablet: 1 orally once a day    Inpatient Recommendations:  -Hold home medications    Discharge Recommendations:  -Stop hydrochlorothiazide due to concern for dehydration  -Crush atorvastatin, amlodipine, and metformin    Gayathri Merrill, PharmD  PGY2 Critical Care Pharmacy Resident  Available on [employer] Teams

## 2023-06-16 ENCOUNTER — LABORATORY RESULT (OUTPATIENT)
Age: 63
End: 2023-06-16

## 2023-06-16 ENCOUNTER — APPOINTMENT (OUTPATIENT)
Dept: CARDIOLOGY | Facility: CLINIC | Age: 63
End: 2023-06-16
Payer: COMMERCIAL

## 2023-06-16 ENCOUNTER — NON-APPOINTMENT (OUTPATIENT)
Age: 63
End: 2023-06-16

## 2023-06-16 VITALS
SYSTOLIC BLOOD PRESSURE: 138 MMHG | HEART RATE: 103 BPM | BODY MASS INDEX: 53.18 KG/M2 | DIASTOLIC BLOOD PRESSURE: 88 MMHG | TEMPERATURE: 98 F | OXYGEN SATURATION: 98 % | HEIGHT: 62 IN | WEIGHT: 289 LBS | RESPIRATION RATE: 16 BRPM

## 2023-06-16 DIAGNOSIS — Z01.810 ENCOUNTER FOR PREPROCEDURAL CARDIOVASCULAR EXAMINATION: ICD-10-CM

## 2023-06-16 PROBLEM — E11.9 TYPE 2 DIABETES MELLITUS WITHOUT COMPLICATIONS: Chronic | Status: ACTIVE | Noted: 2023-06-08

## 2023-06-16 PROBLEM — R42 DIZZINESS AND GIDDINESS: Chronic | Status: ACTIVE | Noted: 2023-06-08

## 2023-06-16 PROBLEM — K44.9 DIAPHRAGMATIC HERNIA WITHOUT OBSTRUCTION OR GANGRENE: Chronic | Status: ACTIVE | Noted: 2023-06-08

## 2023-06-16 PROCEDURE — 99215 OFFICE O/P EST HI 40 MIN: CPT | Mod: 25

## 2023-06-16 PROCEDURE — 93000 ELECTROCARDIOGRAM COMPLETE: CPT | Mod: NC

## 2023-06-16 RX ORDER — AMLODIPINE BESYLATE 5 MG/1
5 TABLET ORAL DAILY
Qty: 90 | Refills: 1 | Status: DISCONTINUED | COMMUNITY
End: 2023-06-16

## 2023-06-16 NOTE — ASSESSMENT
[FreeTextEntry1] : This is a 62-year-old black female with past medical history significant for non-insulin-dependent diabetes mellitus, hypertension, who comes in for cardio metabolic and preoperative consultation. \par \par The patient is scheduled for bariatric surgery with Dr. Grover later on this month.\par The patient's cardiac risk factors include non-insulin-dependent diabetes mellitus, hypertension, history of smoking.\par \par She is currently prescribed amlodipine 5 mg daily and hydrochlorothiazide 25 mg daily.  \par \par CHIEF COMPLAINT:\par Today she is feeling generally well and does not have any complaints at this time. \par \par She denies fever, chills, weight loss, malaise, rash, alteration bowel habits, weakness, abdominal  pain, bloating, changes in urination, visual disturbances, chest pain, headaches, dizziness, heart palpitations, recent episodes of syncope or falls at this time.\par \par \par BLOOD PRESSURE:\par -BP is borderline elevated on today's exam.\par \par BLOOD WORK:\par -New blood work was done 06/16/2023 to evaluate lipid profile, CBC, BMP, hepatic function, A1C and TSH\par \par TESTING/REPORTS:\par -EKG done Jun 16, 2023 which demonstrated regular sinus rhythm with nonspecific ST-T wave changes, BPM of 103 -HR rechecked and is 93 on today's exam.\par \par The patient had normal exercise stress test August 10, 2022.\par \par Echo Doppler examination done August 3, 2022 demonstrated minimal to mild mitral valve regurgitation, mild tricuspid valve regurgitation, satisfactory left ventricular function, mild concentric left ventricular hypertrophy, thinning and dyskinesis of the interatrial septum and normal ejection fraction of 65%.\par \par Electrocardiogram done August 1, 2022 demonstrated normal sinus rhythm at a rate of 86 bpm is otherwise remarkable for left atrial abnormality.\par \par \par PLAN:\par -We will start the patient on telmisartan 40 mg daily and she would prefer this to be combined with her amlodipine 5 mg daily dose\par -She will remain on hydrochlorothiazide 25 mg daily\par -She will continue with her usual medications and will contact the office if she is having any complaints between now and their next follow up appointment.\par -The patient is clear from a cardiac standpoint. Please avoid overhydration. The patient should be allowed to take their usual medications in the perioperative period. Maintain proper DVT prophylaxis. The patient should have an incentive spirometer in the perioperative period. \par \par I have discussed the plan of care with Ms. VESNA WEST  and she  will follow up in 3-4 months. She is compliant with all of her medications.\par The patient understands that aerobic exercises must be increased to at least 20 minutes 4 times/week along with maintaining lifestyle modifications including well-maintained diet. She will contact me at the office for any questions with their care or any changes in their health status.\par \par Angella SALGADO \par \par

## 2023-06-16 NOTE — REASON FOR VISIT
[CV Risk Factors and Non-Cardiac Disease] : CV risk factors and non-cardiac disease [Hypertension] : hypertension [Other: ____] : [unfilled]

## 2023-06-16 NOTE — DISCUSSION/SUMMARY
[FreeTextEntry1] : Dr. Grajeda-(PRIOR VISIT and PMH WITH Dr. Grajeda): \par This is a 61-year-old black female with past medical history significant for non-insulin-dependent diabetes mellitus, hypertension, who comes in for cardio metabolic and preoperative consultation. \par The patient is scheduled for bariatric surgery.\par She denies chest pain, shortness of breath, dizziness, palpitations or syncope.  She does complain of occasional dyspnea on exertion.\par The patient's cardiac risk factors include non-insulin-dependent diabetes mellitus, hypertension, history of smoking.\par The patient had normal exercise stress test August 10, 2022.\par Echo Doppler examination done August 3, 2022 demonstrated minimal to mild mitral valve regurgitation, mild tricuspid valve regurgitation, satisfactory left ventricular function, mild concentric left ventricular hypertrophy, thinning and dyskinesis of the interatrial septum and normal ejection fraction of 65%.\par Electrocardiogram done August 1, 2022 demonstrated normal sinus rhythm at a rate of 86 bpm is otherwise remarkable for left atrial abnormality.\par Blood work done August 1, 2022 demonstrated hemoglobin A1c of 7.0, direct LDL of 105 mg/dL and cholesterol 230 mg/dL, and triglycerides 186 mg/dL.\par As a diabetic the patient should be on statin therapy.  However with bariatric surgery, diabetes may normalize as well as a lipid profile.  She will follow-up with me in 6 months post bariatric surgery to reevaluate her cardio metabolic profile.  No medication is indicated at this time.  Lifestyle change in diet with her bariatric intervention.\par \par \par \par This patient is clear from a cardiac standpoint for bariatric surgery.  She has a normal ejection fraction of 65 to 68%.  Please avoid overhydration.  Maintain prophylaxis for deep venous thrombosis.  The patient should have an incentive spirometer in the perioperative period.\par \par \par Thank you for allowing to participate in care of your patient.\par \par \par

## 2023-06-19 RX ORDER — METRONIDAZOLE 500 MG/1
500 TABLET ORAL 3 TIMES DAILY
Qty: 42 | Refills: 0 | Status: COMPLETED | COMMUNITY
Start: 2022-10-19 | End: 2023-06-19

## 2023-06-19 RX ORDER — BISMUTH SUBSALICYLATE 525MG/15ML
525 SUSPENSION, ORAL (FINAL DOSE FORM) ORAL 4 TIMES DAILY
Qty: 4 | Refills: 0 | Status: COMPLETED | COMMUNITY
Start: 2022-10-19 | End: 2023-06-19

## 2023-06-20 ENCOUNTER — APPOINTMENT (OUTPATIENT)
Dept: SURGERY | Facility: CLINIC | Age: 63
End: 2023-06-20
Payer: COMMERCIAL

## 2023-06-20 VITALS
DIASTOLIC BLOOD PRESSURE: 80 MMHG | HEART RATE: 102 BPM | HEIGHT: 62 IN | TEMPERATURE: 98 F | SYSTOLIC BLOOD PRESSURE: 147 MMHG | WEIGHT: 280 LBS | OXYGEN SATURATION: 98 % | BODY MASS INDEX: 51.53 KG/M2 | RESPIRATION RATE: 16 BRPM

## 2023-06-20 PROCEDURE — 99214 OFFICE O/P EST MOD 30 MIN: CPT

## 2023-06-22 LAB — UREA BREATH TEST QL: NEGATIVE

## 2023-06-23 ENCOUNTER — APPOINTMENT (OUTPATIENT)
Dept: SURGERY | Facility: HOSPITAL | Age: 63
End: 2023-06-23
Payer: COMMERCIAL

## 2023-06-28 ENCOUNTER — TRANSCRIPTION ENCOUNTER (OUTPATIENT)
Age: 63
End: 2023-06-28

## 2023-06-29 ENCOUNTER — INPATIENT (INPATIENT)
Facility: HOSPITAL | Age: 63
LOS: 0 days | Discharge: HOME | DRG: 621 | End: 2023-06-30
Attending: SURGERY | Admitting: SURGERY
Payer: COMMERCIAL

## 2023-06-29 ENCOUNTER — RESULT REVIEW (OUTPATIENT)
Age: 63
End: 2023-06-29

## 2023-06-29 ENCOUNTER — TRANSCRIPTION ENCOUNTER (OUTPATIENT)
Age: 63
End: 2023-06-29

## 2023-06-29 ENCOUNTER — APPOINTMENT (OUTPATIENT)
Dept: SURGERY | Facility: HOSPITAL | Age: 63
End: 2023-06-29
Payer: COMMERCIAL

## 2023-06-29 VITALS
SYSTOLIC BLOOD PRESSURE: 123 MMHG | TEMPERATURE: 98 F | WEIGHT: 281.75 LBS | HEIGHT: 64.17 IN | OXYGEN SATURATION: 98 % | HEART RATE: 90 BPM | DIASTOLIC BLOOD PRESSURE: 77 MMHG | RESPIRATION RATE: 18 BRPM

## 2023-06-29 DIAGNOSIS — Z98.890 OTHER SPECIFIED POSTPROCEDURAL STATES: Chronic | ICD-10-CM

## 2023-06-29 DIAGNOSIS — E66.01 MORBID (SEVERE) OBESITY DUE TO EXCESS CALORIES: ICD-10-CM

## 2023-06-29 LAB
ANION GAP SERPL CALC-SCNC: 19 MMOL/L — HIGH (ref 5–17)
BASOPHILS # BLD AUTO: 0.03 K/UL — SIGNIFICANT CHANGE UP (ref 0–0.2)
BASOPHILS NFR BLD AUTO: 0.4 % — SIGNIFICANT CHANGE UP (ref 0–2)
BUN SERPL-MCNC: 9 MG/DL — SIGNIFICANT CHANGE UP (ref 7–23)
CALCIUM SERPL-MCNC: 9.5 MG/DL — SIGNIFICANT CHANGE UP (ref 8.4–10.5)
CHLORIDE SERPL-SCNC: 99 MMOL/L — SIGNIFICANT CHANGE UP (ref 96–108)
CO2 SERPL-SCNC: 19 MMOL/L — LOW (ref 22–31)
CREAT SERPL-MCNC: 0.82 MG/DL — SIGNIFICANT CHANGE UP (ref 0.5–1.3)
EGFR: 80 ML/MIN/1.73M2 — SIGNIFICANT CHANGE UP
EOSINOPHIL # BLD AUTO: 0.03 K/UL — SIGNIFICANT CHANGE UP (ref 0–0.5)
EOSINOPHIL NFR BLD AUTO: 0.4 % — SIGNIFICANT CHANGE UP (ref 0–6)
GLUCOSE BLDC GLUCOMTR-MCNC: 124 MG/DL — HIGH (ref 70–99)
GLUCOSE BLDC GLUCOMTR-MCNC: 132 MG/DL — HIGH (ref 70–99)
GLUCOSE SERPL-MCNC: 181 MG/DL — HIGH (ref 70–99)
HCT VFR BLD CALC: 40.8 % — SIGNIFICANT CHANGE UP (ref 34.5–45)
HGB BLD-MCNC: 12.8 G/DL — SIGNIFICANT CHANGE UP (ref 11.5–15.5)
IMM GRANULOCYTES NFR BLD AUTO: 0.3 % — SIGNIFICANT CHANGE UP (ref 0–0.9)
LYMPHOCYTES # BLD AUTO: 2.6 K/UL — SIGNIFICANT CHANGE UP (ref 1–3.3)
LYMPHOCYTES # BLD AUTO: 32.9 % — SIGNIFICANT CHANGE UP (ref 13–44)
MAGNESIUM SERPL-MCNC: 1.9 MG/DL — SIGNIFICANT CHANGE UP (ref 1.6–2.6)
MCHC RBC-ENTMCNC: 26.9 PG — LOW (ref 27–34)
MCHC RBC-ENTMCNC: 31.4 GM/DL — LOW (ref 32–36)
MCV RBC AUTO: 85.9 FL — SIGNIFICANT CHANGE UP (ref 80–100)
MONOCYTES # BLD AUTO: 0.35 K/UL — SIGNIFICANT CHANGE UP (ref 0–0.9)
MONOCYTES NFR BLD AUTO: 4.4 % — SIGNIFICANT CHANGE UP (ref 2–14)
NEUTROPHILS # BLD AUTO: 4.87 K/UL — SIGNIFICANT CHANGE UP (ref 1.8–7.4)
NEUTROPHILS NFR BLD AUTO: 61.6 % — SIGNIFICANT CHANGE UP (ref 43–77)
NRBC # BLD: 0 /100 WBCS — SIGNIFICANT CHANGE UP (ref 0–0)
PHOSPHATE SERPL-MCNC: 3.3 MG/DL — SIGNIFICANT CHANGE UP (ref 2.5–4.5)
PLATELET # BLD AUTO: 260 K/UL — SIGNIFICANT CHANGE UP (ref 150–400)
POTASSIUM SERPL-MCNC: 3.6 MMOL/L — SIGNIFICANT CHANGE UP (ref 3.5–5.3)
POTASSIUM SERPL-SCNC: 3.6 MMOL/L — SIGNIFICANT CHANGE UP (ref 3.5–5.3)
RBC # BLD: 4.75 M/UL — SIGNIFICANT CHANGE UP (ref 3.8–5.2)
RBC # FLD: 14.5 % — SIGNIFICANT CHANGE UP (ref 10.3–14.5)
SODIUM SERPL-SCNC: 137 MMOL/L — SIGNIFICANT CHANGE UP (ref 135–145)
WBC # BLD: 7.9 K/UL — SIGNIFICANT CHANGE UP (ref 3.8–10.5)
WBC # FLD AUTO: 7.9 K/UL — SIGNIFICANT CHANGE UP (ref 3.8–10.5)

## 2023-06-29 PROCEDURE — 88341 IMHCHEM/IMCYTCHM EA ADD ANTB: CPT | Mod: 26

## 2023-06-29 PROCEDURE — 43775 LAP SLEEVE GASTRECTOMY: CPT

## 2023-06-29 PROCEDURE — 43775 LAP SLEEVE GASTRECTOMY: CPT | Mod: 82

## 2023-06-29 PROCEDURE — 88307 TISSUE EXAM BY PATHOLOGIST: CPT | Mod: 26

## 2023-06-29 PROCEDURE — 88342 IMHCHEM/IMCYTCHM 1ST ANTB: CPT | Mod: 26

## 2023-06-29 DEVICE — STAPLER COVIDIEN TRI-STAPLE 60MM BLACK INTELLIGENT RELOAD: Type: IMPLANTABLE DEVICE | Status: FUNCTIONAL

## 2023-06-29 DEVICE — STAPLER COVIDIEN TRI-STAPLE 60MM PURPLE INTELLIGENT RELOAD: Type: IMPLANTABLE DEVICE | Status: FUNCTIONAL

## 2023-06-29 DEVICE — STAPLER COVIDIEN TRI-STAPLE 45MM PURPLE INTELLIGENT RELOAD: Type: IMPLANTABLE DEVICE | Status: FUNCTIONAL

## 2023-06-29 DEVICE — CLIP APPLIER COVIDIEN ENDOCLIP III 5MM: Type: IMPLANTABLE DEVICE | Status: FUNCTIONAL

## 2023-06-29 RX ORDER — KETOROLAC TROMETHAMINE 30 MG/ML
30 SYRINGE (ML) INJECTION EVERY 6 HOURS
Refills: 0 | Status: DISCONTINUED | OUTPATIENT
Start: 2023-06-29 | End: 2023-06-30

## 2023-06-29 RX ORDER — ONDANSETRON 8 MG/1
1 TABLET, FILM COATED ORAL
Qty: 21 | Refills: 0
Start: 2023-06-29

## 2023-06-29 RX ORDER — HEPARIN SODIUM 5000 [USP'U]/ML
5000 INJECTION INTRAVENOUS; SUBCUTANEOUS EVERY 8 HOURS
Refills: 0 | Status: DISCONTINUED | OUTPATIENT
Start: 2023-06-29 | End: 2023-06-30

## 2023-06-29 RX ORDER — POTASSIUM CHLORIDE 20 MEQ
10 PACKET (EA) ORAL
Refills: 0 | Status: COMPLETED | OUTPATIENT
Start: 2023-06-29 | End: 2023-06-29

## 2023-06-29 RX ORDER — SODIUM CHLORIDE 9 MG/ML
1000 INJECTION, SOLUTION INTRAVENOUS
Refills: 0 | Status: COMPLETED | OUTPATIENT
Start: 2023-06-29 | End: 2023-06-29

## 2023-06-29 RX ORDER — HYDROMORPHONE HYDROCHLORIDE 2 MG/ML
0.25 INJECTION INTRAMUSCULAR; INTRAVENOUS; SUBCUTANEOUS ONCE
Refills: 0 | Status: DISCONTINUED | OUTPATIENT
Start: 2023-06-29 | End: 2023-06-29

## 2023-06-29 RX ORDER — ENOXAPARIN SODIUM 100 MG/ML
40 INJECTION SUBCUTANEOUS
Qty: 14 | Refills: 0
Start: 2023-06-29 | End: 2023-07-12

## 2023-06-29 RX ORDER — HYOSCYAMINE SULFATE 0.13 MG
0.12 TABLET ORAL EVERY 4 HOURS
Refills: 0 | Status: DISCONTINUED | OUTPATIENT
Start: 2023-06-29 | End: 2023-06-30

## 2023-06-29 RX ORDER — ACETAMINOPHEN 500 MG
1000 TABLET ORAL ONCE
Refills: 0 | Status: COMPLETED | OUTPATIENT
Start: 2023-06-29 | End: 2023-06-29

## 2023-06-29 RX ORDER — ACETAMINOPHEN 500 MG
1000 TABLET ORAL ONCE
Refills: 0 | Status: DISCONTINUED | OUTPATIENT
Start: 2023-06-29 | End: 2023-06-30

## 2023-06-29 RX ORDER — HYOSCYAMINE SULFATE 0.13 MG
1 TABLET ORAL
Qty: 28 | Refills: 0
Start: 2023-06-29

## 2023-06-29 RX ORDER — FOSAPREPITANT DIMEGLUMINE 150 MG/5ML
150 INJECTION, POWDER, LYOPHILIZED, FOR SOLUTION INTRAVENOUS ONCE
Refills: 0 | Status: COMPLETED | OUTPATIENT
Start: 2023-06-29 | End: 2023-06-29

## 2023-06-29 RX ORDER — HEPARIN SODIUM 5000 [USP'U]/ML
5000 INJECTION INTRAVENOUS; SUBCUTANEOUS ONCE
Refills: 0 | Status: COMPLETED | OUTPATIENT
Start: 2023-06-29 | End: 2023-06-29

## 2023-06-29 RX ORDER — SIMETHICONE 80 MG/1
80 TABLET, CHEWABLE ORAL ONCE
Refills: 0 | Status: COMPLETED | OUTPATIENT
Start: 2023-06-29 | End: 2023-06-29

## 2023-06-29 RX ORDER — CHLORHEXIDINE GLUCONATE 213 G/1000ML
1 SOLUTION TOPICAL ONCE
Refills: 0 | Status: DISCONTINUED | OUTPATIENT
Start: 2023-06-29 | End: 2023-06-29

## 2023-06-29 RX ORDER — FOLIC ACID 0.8 MG
1 TABLET ORAL DAILY
Refills: 0 | Status: DISCONTINUED | OUTPATIENT
Start: 2023-06-29 | End: 2023-06-30

## 2023-06-29 RX ORDER — ONDANSETRON 8 MG/1
4 TABLET, FILM COATED ORAL EVERY 6 HOURS
Refills: 0 | Status: DISCONTINUED | OUTPATIENT
Start: 2023-06-29 | End: 2023-06-30

## 2023-06-29 RX ORDER — ACETAMINOPHEN 500 MG
15 TABLET ORAL
Qty: 300 | Refills: 0
Start: 2023-06-29

## 2023-06-29 RX ORDER — ACETAMINOPHEN 500 MG
1000 TABLET ORAL ONCE
Refills: 0 | Status: COMPLETED | OUTPATIENT
Start: 2023-06-30 | End: 2023-06-30

## 2023-06-29 RX ORDER — SODIUM CHLORIDE 9 MG/ML
3 INJECTION INTRAMUSCULAR; INTRAVENOUS; SUBCUTANEOUS EVERY 8 HOURS
Refills: 0 | Status: DISCONTINUED | OUTPATIENT
Start: 2023-06-29 | End: 2023-06-29

## 2023-06-29 RX ORDER — HYDROCHLOROTHIAZIDE 25 MG
1 TABLET ORAL
Refills: 0 | DISCHARGE

## 2023-06-29 RX ORDER — LIDOCAINE HCL 20 MG/ML
0.2 VIAL (ML) INJECTION ONCE
Refills: 0 | Status: DISCONTINUED | OUTPATIENT
Start: 2023-06-29 | End: 2023-06-29

## 2023-06-29 RX ORDER — METFORMIN HYDROCHLORIDE 850 MG/1
1 TABLET ORAL
Qty: 0 | Refills: 0 | DISCHARGE

## 2023-06-29 RX ORDER — ATORVASTATIN CALCIUM 80 MG/1
1 TABLET, FILM COATED ORAL
Qty: 0 | Refills: 0 | DISCHARGE

## 2023-06-29 RX ORDER — SODIUM CHLORIDE 9 MG/ML
1000 INJECTION, SOLUTION INTRAVENOUS
Refills: 0 | Status: DISCONTINUED | OUTPATIENT
Start: 2023-06-29 | End: 2023-06-30

## 2023-06-29 RX ORDER — CEFAZOLIN SODIUM 1 G
3000 VIAL (EA) INJECTION ONCE
Refills: 0 | Status: COMPLETED | OUTPATIENT
Start: 2023-06-29 | End: 2023-06-29

## 2023-06-29 RX ORDER — THIAMINE MONONITRATE (VIT B1) 100 MG
100 TABLET ORAL ONCE
Refills: 0 | Status: COMPLETED | OUTPATIENT
Start: 2023-06-29 | End: 2023-06-29

## 2023-06-29 RX ORDER — OMEPRAZOLE 10 MG/1
1 CAPSULE, DELAYED RELEASE ORAL
Qty: 30 | Refills: 0
Start: 2023-06-29

## 2023-06-29 RX ORDER — AMLODIPINE BESYLATE 2.5 MG/1
1 TABLET ORAL
Qty: 0 | Refills: 0 | DISCHARGE

## 2023-06-29 RX ORDER — PANTOPRAZOLE SODIUM 20 MG/1
40 TABLET, DELAYED RELEASE ORAL EVERY 24 HOURS
Refills: 0 | Status: DISCONTINUED | OUTPATIENT
Start: 2023-06-29 | End: 2023-06-30

## 2023-06-29 RX ADMIN — SODIUM CHLORIDE 150 MILLILITER(S): 9 INJECTION, SOLUTION INTRAVENOUS at 15:34

## 2023-06-29 RX ADMIN — Medication 400 MILLIGRAM(S): at 22:12

## 2023-06-29 RX ADMIN — Medication 1 MILLIGRAM(S): at 11:35

## 2023-06-29 RX ADMIN — SODIUM CHLORIDE 250 MILLILITER(S): 9 INJECTION, SOLUTION INTRAVENOUS at 15:34

## 2023-06-29 RX ADMIN — Medication 30 MILLIGRAM(S): at 15:32

## 2023-06-29 RX ADMIN — PANTOPRAZOLE SODIUM 40 MILLIGRAM(S): 20 TABLET, DELAYED RELEASE ORAL at 09:57

## 2023-06-29 RX ADMIN — FOSAPREPITANT DIMEGLUMINE 300 MILLIGRAM(S): 150 INJECTION, POWDER, LYOPHILIZED, FOR SOLUTION INTRAVENOUS at 07:00

## 2023-06-29 RX ADMIN — SIMETHICONE 80 MILLIGRAM(S): 80 TABLET, CHEWABLE ORAL at 15:40

## 2023-06-29 RX ADMIN — HEPARIN SODIUM 5000 UNIT(S): 5000 INJECTION INTRAVENOUS; SUBCUTANEOUS at 22:13

## 2023-06-29 RX ADMIN — FOSAPREPITANT DIMEGLUMINE 300 MILLIGRAM(S): 150 INJECTION, POWDER, LYOPHILIZED, FOR SOLUTION INTRAVENOUS at 14:45

## 2023-06-29 RX ADMIN — ONDANSETRON 4 MILLIGRAM(S): 8 TABLET, FILM COATED ORAL at 11:35

## 2023-06-29 RX ADMIN — ONDANSETRON 4 MILLIGRAM(S): 8 TABLET, FILM COATED ORAL at 17:03

## 2023-06-29 RX ADMIN — Medication 100 MILLIEQUIVALENT(S): at 15:33

## 2023-06-29 RX ADMIN — Medication 400 MILLIGRAM(S): at 15:43

## 2023-06-29 RX ADMIN — Medication 100 MILLIEQUIVALENT(S): at 14:08

## 2023-06-29 RX ADMIN — Medication 30 MILLIGRAM(S): at 20:00

## 2023-06-29 RX ADMIN — HYDROMORPHONE HYDROCHLORIDE 0.25 MILLIGRAM(S): 2 INJECTION INTRAMUSCULAR; INTRAVENOUS; SUBCUTANEOUS at 10:15

## 2023-06-29 RX ADMIN — Medication 0.12 MILLIGRAM(S): at 22:13

## 2023-06-29 RX ADMIN — SODIUM CHLORIDE 150 MILLILITER(S): 9 INJECTION, SOLUTION INTRAVENOUS at 20:00

## 2023-06-29 RX ADMIN — HYDROMORPHONE HYDROCHLORIDE 0.25 MILLIGRAM(S): 2 INJECTION INTRAMUSCULAR; INTRAVENOUS; SUBCUTANEOUS at 10:00

## 2023-06-29 RX ADMIN — Medication 30 MILLIGRAM(S): at 20:30

## 2023-06-29 RX ADMIN — Medication 100 MILLIGRAM(S): at 09:58

## 2023-06-29 RX ADMIN — HEPARIN SODIUM 5000 UNIT(S): 5000 INJECTION INTRAVENOUS; SUBCUTANEOUS at 06:59

## 2023-06-29 RX ADMIN — Medication 100 MILLIEQUIVALENT(S): at 12:44

## 2023-06-29 RX ADMIN — Medication 1000 MILLIGRAM(S): at 22:42

## 2023-06-29 RX ADMIN — HEPARIN SODIUM 5000 UNIT(S): 5000 INJECTION INTRAVENOUS; SUBCUTANEOUS at 14:08

## 2023-06-29 RX ADMIN — Medication 0.12 MILLIGRAM(S): at 14:08

## 2023-06-29 RX ADMIN — SODIUM CHLORIDE 250 MILLILITER(S): 9 INJECTION, SOLUTION INTRAVENOUS at 10:53

## 2023-06-29 NOTE — DISCHARGE NOTE PROVIDER - NSDCMRMEDTOKEN_GEN_ALL_CORE_FT
acetaminophen 500 mg/15 mL oral liquid: 15 milliliter(s) orally every 6 hours  atorvastatin 20 mg oral tablet: 1 orally once a day  hydroCHLOROthiazide 25 mg oral tablet: 1 orally once a day  Levsin SL 0.125 mg sublingual tablet: 1 tab(s) sublingually every 6 hours as needed for gastric spasm  Levsin SL 0.125 mg sublingual tablet: 1 tab(s) sublingually every 6 hours as needed for gastric spasm  metFORMIN 500 mg oral tablet: 1 orally once a day  Norvasc 5 mg oral tablet: 1 orally once a day  omeprazole 40 mg oral delayed release capsule: 1 cap(s) orally once a day open capsule and sprinkle into applesauce  ondansetron 4 mg oral tablet, disintegratin tab(s) orally 3 times a day as needed for  nausea   acetaminophen 500 mg/15 mL oral liquid: 15 milliliter(s) orally every 6 hours  atorvastatin 20 mg oral tablet: 1 orally once a day  hydroCHLOROthiazide 25 mg oral tablet: 1 orally once a day  Levsin SL 0.125 mg sublingual tablet: 1 tab(s) sublingually every 6 hours as needed for gastric spasm  metFORMIN 500 mg oral tablet: 1 orally once a day  Norvasc 5 mg oral tablet: 1 orally once a day  omeprazole 40 mg oral delayed release capsule: 1 cap(s) orally once a day open capsule and sprinkle into applesauce  ondansetron 4 mg oral tablet, disintegratin tab(s) orally 3 times a day as needed for  nausea   acetaminophen 500 mg/15 mL oral liquid: 15 milliliter(s) orally every 6 hours  atorvastatin 20 mg oral tablet: 1 orally once a day  enoxaparin 40 mg/0.4 mL injectable solution: 40 milligram(s) subcutaneously once a day  hydroCHLOROthiazide 25 mg oral tablet: 1 orally once a day  Levsin SL 0.125 mg sublingual tablet: 1 tab(s) sublingually every 6 hours as needed for gastric spasm  metFORMIN 500 mg oral tablet: 1 orally once a day  Norvasc 5 mg oral tablet: 1 orally once a day  omeprazole 40 mg oral delayed release capsule: 1 cap(s) orally once a day open capsule and sprinkle into applesauce  ondansetron 4 mg oral tablet, disintegratin tab(s) orally 3 times a day as needed for  nausea   acetaminophen 500 mg/15 mL oral liquid: 15 milliliter(s) orally every 6 hours  atorvastatin 20 mg oral tablet: 1 tablet orally once a day  enoxaparin 40 mg/0.4 mL injectable solution: 40 milligram(s) subcutaneously once a day  Levsin SL 0.125 mg sublingual tablet: 1 tab(s) sublingually every 6 hours as needed for gastric spasm  metFORMIN 500 mg oral tablet: 1 tablet orally once a day crush pills  Norvasc 5 mg oral tablet: 1 tablet orally once a day crush pills  omeprazole 40 mg oral delayed release capsule: 1 cap(s) orally once a day open capsule and sprinkle into applesauce  ondansetron 4 mg oral tablet, disintegratin tab(s) orally 3 times a day as needed for  nausea

## 2023-06-29 NOTE — DISCHARGE NOTE PROVIDER - NSDCCPCAREPLAN_GEN_ALL_CORE_FT
PRINCIPAL DISCHARGE DIAGNOSIS  Diagnosis: Morbid (severe) obesity due to excess calories  Assessment and Plan of Treatment: WOUND CARE: -Covering your incisions are white pieces of tape called steri-strips. You can shower with these and they will curl up and begin to fall off on their own in about 7 days.   BATHING: Please do not submerge wound underwater. You may shower and/or sponge bathe.  ACTIVITY: No heavy lifting or straining. Otherwise, you may return to your usual level of physical activity. If you are taking narcotic pain medication (such as Percocet), do NOT drive a car, operate machinery or make important decisions.  DIET: Bariatric full liquids starting tomorrow x 2 weeks.  Then soft/puree for 30 days.  Please follow up with your dietitian within 30 days.   NOTIFY YOUR SURGEON IF: You have any bleeding that does not stop, any pus draining from your wound, any fever (over 100.4 F) or chills, persistent nausea/vomiting, persistent diarrhea, or if your pain is not controlled on your discharge pain medications.  FOLLOW-UP:  1. Follow-up with Dr. Grover within 1-2 weeks of discharge.  Please call office for appointment  2. Please follow up with your primary care physician in one week regarding your hospitalization.     PRINCIPAL DISCHARGE DIAGNOSIS  Diagnosis: Morbid (severe) obesity due to excess calories  Assessment and Plan of Treatment: WOUND CARE: -Covering your incisions are white pieces of tape called steri-strips. You can shower with these and they will curl up and begin to fall off on their own in about 7 days.   BATHING: Please do not submerge wound underwater. You may shower and/or sponge bathe.  ACTIVITY: No heavy lifting or straining. Otherwise, you may return to your usual level of physical activity.   DIET: Bariatric full liquids starting tomorrow x 2 weeks.  Then soft/puree for 30 days.  Please follow up with your dietitian within 30 days.   FOLLOW-UP:  1. Follow-up with Dr. Grover within 1-2 weeks of discharge.  Please call office for appointment  2. Please follow up with your primary care physician in one week regarding your hospitalization.  Call office for nausea, vomiting, fever, abdominal pain and drainage from surgical sites. If you develop shortness of breath, difficulty breathing, chest pain, fever, calf pain with swelling visit your closest emergency room.   Please take tylenol around the clock every 4-6 hours as needed for pain. Do not exceed 4,000mg in 24 hours.  Use incentive spirometry four times daily for 10 days. Take your temperature daily and report fever and chills to your surgeon's office  Walk daily until you can walk 30 minutes at a time. Avoid heavy lifting or straining for 8 weeks. When you do lift, keep your back straight and bend at the knees allowing your legs to do most of the work.   Take medications as prescribed by your doctor. Resume any medications you took prior to surgery if permitted by your doctor. Medication should be liquid, chewable or crushed. Please note extended release medication cannot be crushed and should be changed by your doctor.   Take vitamin/ supplements daily in form of liquid, chewable or crush  -multivitamin chewable with iron, 2 tabs daily or liquid multivitamin  -calcium citrate with vitamin D 400iu, 500mg or 600mg 1 tab twice daily (do not take at the same time with the other vitamins)  -Vitamin B12 1000mcg sublingual once per week  -Th     PRINCIPAL DISCHARGE DIAGNOSIS  Diagnosis: Morbid (severe) obesity due to excess calories  Assessment and Plan of Treatment: WOUND CARE: -Take off top white stickers tomorrow. Covering your incisions are white pieces of tape called steri-strips. You can shower with these and they will curl up and begin to fall off on their own in about 7 days.   DIET: Bariatric full liquids starting tomorrow x 2 weeks.  Then soft/puree for 30 days.  Please follow up with your dietitian within 30 days.   Call office for nausea, vomiting, fever, abdominal pain and drainage from surgical sites. If you develop shortness of breath, difficulty breathing, chest pain, fever, calf pain with swelling visit your closest emergency room.   Please take tylenol around the clock every 4-6 hours as needed for pain. Do not exceed 4,000mg in 24 hours.  Use incentive spirometry four times daily for 10 days. Take your temperature daily and report fever and chills to your surgeon's office  Walk daily until you can walk 30 minutes at a time. Avoid heavy lifting or straining for 8 weeks. When you do lift, keep your back straight and bend at the knees allowing your legs to do most of the work.   Take medications as prescribed by your doctor. Resume any medications you took prior to surgery if permitted by your doctor. Medication should be liquid, chewable or crushed. Please note extended release medication cannot be crushed and should be changed by your doctor.   Take vitamin/ supplements daily in form of liquid, chewable or crush  -multivitamin chewable with iron, 2 tabs daily or liquid multivitamin  -calcium citrate with vitamin D 400iu, 500mg or 600mg 1 tab twice daily (do not take at the same time with the other vitamins)  -Vitamin B12 1000mcg sublingual once per week  -Thiamin B1 100mg daily crush  FOLLOW-UP:  1. Follow-up with Dr. Grover within 1-2 weeks of discharge.  Please call office for appointment  2. Please follow up with your primary care physician in one week regarding your hospitalization.

## 2023-06-29 NOTE — PATIENT PROFILE ADULT - FALL HARM RISK - UNIVERSAL INTERVENTIONS
Bed in lowest position, wheels locked, appropriate side rails in place/Call bell, personal items and telephone in reach/Instruct patient to call for assistance before getting out of bed or chair/Non-slip footwear when patient is out of bed/[place] to call system/Physically safe environment - no spills, clutter or unnecessary equipment/Purposeful Proactive Rounding/Room/bathroom lighting operational, light cord in reach
(2) cough or sneeze

## 2023-06-29 NOTE — PROGRESS NOTE ADULT - ASSESSMENT
ASSESSMENT/PLAN: 61 y/o F with PMHx Morbid Obesity, GABY on CPAP, T2DM, HTN, HLD sp lap sleeve 6/29    - SQH for VTE ppx  - Adv to clears 6hours post op if no n/v  - LR @150cc/hr  - pain control  - zofran prn for n/v  - OOB/ambulate  - AM labs    [facility] [id number] ASSESSMENT/PLAN: 61 y/o F with PMHx Morbid Obesity, GABY on CPAP, T2DM, HTN, HLD sp lap sleeve 6/29    - Bariatric protocol  - SQH for VTE ppx  - Adv to clears 6hours post op if no n/v  - LR @150cc/hr  - pain control  - zofran prn for n/v  - OOB/ambulate  - AM labs    [facility] [id number]

## 2023-06-29 NOTE — CHART NOTE - NSCHARTNOTEFT_GEN_A_CORE
Post Operative Note  Patient: VESNA WEST 62y (1960) Female   MRN: [id number]  Location: [facility] 2MON 202 D1  Visit: 06-29-23 Inpatient  Date: 06-30-23 @ 03:46    Procedure: S/P laparoscopic sleeve gastrectomy    Subjective: Patient seen and examined post operatively. Reports pain as controlled. Denies nausea, vomiting, fever, chills, chest pain, SOB, cough.      Objective:  Vitals: T(F): 98.3 (06-29-23 @ 23:50), Max: 98.8 (06-29-23 @ 17:00)  HR: 88 (06-29-23 @ 23:50)  BP: 128/77 (06-29-23 @ 23:50) (123/77 - 180/72)  RR: 18 (06-29-23 @ 23:50)  SpO2: 98% (06-29-23 @ 23:50)  Vent Settings:     In:   06-29-23 @ 07:01  -  06-30-23 @ 03:46  --------------------------------------------------------  IN: 1495 mL      IV Fluids: folic acid Injectable 1 milliGRAM(s) SubCutaneous daily  lactated ringers. 1000 milliLiter(s) (150 mL/Hr) IV Continuous <Continuous>      Out:   06-29-23 @ 07:01  -  06-30-23 @ 03:46  --------------------------------------------------------  OUT: 3600 mL      EBL:   06-29-23 @ 07:01  -  06-30-23 @ 03:46  --------------------------------------------------------  OUT: 0 mL      Voided Urine:   06-29-23 @ 07:01  -  06-30-23 @ 03:46  --------------------------------------------------------  OUT: 3600 mL    Physical Examination:  General: NAD, resting comfortably in bed  HEENT: Normocephalic atraumatic  Respiratory: Nonlabored respirations, normal CW expansion.  Cardio: S1S2, regular rate and rhythm.  Abdomen: softly distended, appropriately tender, surgical incisions are c/d/i.  Vascular: extremities are warm and well perfused.     Imaging:  No post-op imaging studies    Assessment:  62yFemale patient S/P laparoscopic sleeve gastrectomy. Patient feeling well. Lying comfortably in bed. Denies pain, nausea and vomiting.     Plan:  - Pain control PRN  - Diet: CLD  - IVF  - Tomas, DTV  - Activity: OOB/Ambulating  - DVT ppx: SCD's & Heparin SQ     Date/Time: 06-30-23 @ 03:46

## 2023-06-29 NOTE — DISCHARGE NOTE PROVIDER - HOSPITAL COURSE
63 y/o F with PMHx Morbid Obesity, GABY on CPAP, T2DM, HTN, HLD, presents to [facility] for pre-procedure evaluation. Patient reports has had difficulties losing weight after numerous attempts. She is scheduled for Laparoscopic Sleeve Gastrectomy; Possible Hiatal Hernia Repair with Dr. Grover on 06/29/2023.     On 6/29/2023 who has a history of Morbid Obesity, GABY on CPAP, T2DM, HTN, HLD, BMI 48.1 underwent laparoscopic sleeve gastrectomy. Bariatric ERAS protocol followed to include preoperative and perioperative use of DVT, SSI prophylaxis as well as multi-modal non-opioid analgesics. Patient tolerated the procedure well, extubated in the operating room then transferred to the PACU in stable condition. Once hemodynamically stable and effective pain control the patient was able to ambulate with assistance. Pt was also able to void within 4 hours following the procedure. The patient was later transferred to a bariatric unit and placed on bedside continuous pulse oximetry. Postoperative pain management included IV multi-modal non-opioid analgesics. The patient tolerated bariatric clear liquid the evening of surgery.     On POD #1 patient remained stable with no acute events overnight, had effective pain control. Denies nausea and vomiting. Patient is ambulating independently and voiding as expected. The rest of the hospital course was uneventful, patient met 8-Point Bariatric Surgery D/C criteria and subsequently cleared for discharge home on POD#1.  Pt discharged home on 2 weeks of lovenox for VTE prophylaxis required (calculated [facility] VTE Risk Stratification Score (0.27% ). The patient will follow a protocol-derived staged meal progression supervised by the dietitian in the outpatient setting. Patient will follow-up with Dr. Grover in 7-10 days, medical doctor and dietitian in 30 days. [facility] prior to discharge. Written discharge instruction explained and given to include VTE prevention.

## 2023-06-29 NOTE — DISCHARGE NOTE PROVIDER - NSRESEARCHGRANT_OVERRIDEREC_GEN_A_CORE
This is a surgical and/or non-medical patient. Planned surgery/procedure/This is a surgical and/or non-medical patient.

## 2023-06-29 NOTE — DISCHARGE NOTE PROVIDER - CARE PROVIDER_API CALL
Shekhar Grover  Surgery  [address] [place] [address], [facility] [address]  [place], [place] [id number]  Phone: [phone]  Fax: [phone]  Follow Up Time: 2 weeks

## 2023-06-29 NOTE — PROGRESS NOTE ADULT - SUBJECTIVE AND OBJECTIVE BOX
SURGERY DAILY PROGRESS NOTE    STATUS POST:  Lap sleeve gastrectomy    SUBJECTIVE: Pt seen and examined at bedside. Admits to soreness and intermittent nausea. Denies chest pain, SOB, palpitations, HA, fever, chills, V. Ambulating and voiding.      OBJECTIVE:  Vital Signs Last 24 Hrs  T(C): 36.4 (29 Jun 2023 13:58), Max: 36.5 (29 Jun 2023 06:47)  T(F): 97.5 (29 Jun 2023 13:58), Max: 97.7 (29 Jun 2023 06:47)  HR: 79 (29 Jun 2023 13:58) (79 - 99)  BP: 159/88 (29 Jun 2023 13:58) (123/77 - 174/81)  BP(mean): 103 (29 Jun 2023 13:00) (97 - 117)  RR: 16 (29 Jun 2023 13:58) (16 - 18)  SpO2: 97% (29 Jun 2023 13:58) (94% - 100%)    Parameters below as of 29 Jun 2023 13:58  Patient On (Oxygen Delivery Method): nasal cannula  O2 Flow (L/min): 2      I&O's Summary    29 Jun 2023 07:01  -  29 Jun 2023 14:28  --------------------------------------------------------  IN: 1375 mL / OUT: 1200 mL / NET: 175 mL        Physical Exam:  General Appearance: Appears well, NAD, A& O x 3  Neck: Supple  Chest: Equal expansion bilaterally, equal breath sounds  CV: Pulse regular presently  Abdomen: Soft, ND, appropriate incisional tenderness, dressings clean and dry and intact  Extremities: Grossly symmetric, SCD's in place     LABS:                        12.8   7.90  )-----------( 260      ( 29 Jun 2023 10:50 )             40.8     06-29    137  |  99  |  9   ----------------------------<  181<H>  3.6   |  19<L>  |  0.82    Ca    9.5      29 Jun 2023 10:50  Phos  3.3     06-29  Mg     1.9     06-29        Urinalysis Basic - ( 29 Jun 2023 10:50 )    Color: x / Appearance: x / SG: x / pH: x  Gluc: 181 mg/dL / Ketone: x  / Bili: x / Urobili: x   Blood: x / Protein: x / Nitrite: x   Leuk Esterase: x / RBC: x / WBC x   Sq Epi: x / Non Sq Epi: x / Bacteria: x        RADIOLOGY & ADDITIONAL STUDIES:

## 2023-06-29 NOTE — DISCHARGE NOTE PROVIDER - NSDCFUADDINST_GEN_ALL_CORE_FT
You are being discharged home with 2 weeks of Lovenox injections to decrease your risk of getting a blood clot.  Please follow instructions as taught to you by the nursing staff.

## 2023-06-30 ENCOUNTER — TRANSCRIPTION ENCOUNTER (OUTPATIENT)
Age: 63
End: 2023-06-30

## 2023-06-30 VITALS — WEIGHT: 281.75 LBS

## 2023-06-30 LAB
ANION GAP SERPL CALC-SCNC: 11 MMOL/L — SIGNIFICANT CHANGE UP (ref 5–17)
BASOPHILS # BLD AUTO: 0.01 K/UL — SIGNIFICANT CHANGE UP (ref 0–0.2)
BASOPHILS NFR BLD AUTO: 0.1 % — SIGNIFICANT CHANGE UP (ref 0–2)
BUN SERPL-MCNC: 10 MG/DL — SIGNIFICANT CHANGE UP (ref 7–23)
CALCIUM SERPL-MCNC: 9.1 MG/DL — SIGNIFICANT CHANGE UP (ref 8.4–10.5)
CHLORIDE SERPL-SCNC: 101 MMOL/L — SIGNIFICANT CHANGE UP (ref 96–108)
CO2 SERPL-SCNC: 23 MMOL/L — SIGNIFICANT CHANGE UP (ref 22–31)
CREAT SERPL-MCNC: 0.89 MG/DL — SIGNIFICANT CHANGE UP (ref 0.5–1.3)
EGFR: 73 ML/MIN/1.73M2 — SIGNIFICANT CHANGE UP
EOSINOPHIL # BLD AUTO: 0 K/UL — SIGNIFICANT CHANGE UP (ref 0–0.5)
EOSINOPHIL NFR BLD AUTO: 0 % — SIGNIFICANT CHANGE UP (ref 0–6)
GLUCOSE SERPL-MCNC: 137 MG/DL — HIGH (ref 70–99)
HCT VFR BLD CALC: 37.5 % — SIGNIFICANT CHANGE UP (ref 34.5–45)
HGB BLD-MCNC: 12.1 G/DL — SIGNIFICANT CHANGE UP (ref 11.5–15.5)
IMM GRANULOCYTES NFR BLD AUTO: 0.8 % — SIGNIFICANT CHANGE UP (ref 0–0.9)
LYMPHOCYTES # BLD AUTO: 1.39 K/UL — SIGNIFICANT CHANGE UP (ref 1–3.3)
LYMPHOCYTES # BLD AUTO: 13.2 % — SIGNIFICANT CHANGE UP (ref 13–44)
MCHC RBC-ENTMCNC: 27.4 PG — SIGNIFICANT CHANGE UP (ref 27–34)
MCHC RBC-ENTMCNC: 32.3 GM/DL — SIGNIFICANT CHANGE UP (ref 32–36)
MCV RBC AUTO: 84.8 FL — SIGNIFICANT CHANGE UP (ref 80–100)
MONOCYTES # BLD AUTO: 0.98 K/UL — HIGH (ref 0–0.9)
MONOCYTES NFR BLD AUTO: 9.3 % — SIGNIFICANT CHANGE UP (ref 2–14)
NEUTROPHILS # BLD AUTO: 8.06 K/UL — HIGH (ref 1.8–7.4)
NEUTROPHILS NFR BLD AUTO: 76.6 % — SIGNIFICANT CHANGE UP (ref 43–77)
NRBC # BLD: 0 /100 WBCS — SIGNIFICANT CHANGE UP (ref 0–0)
PLATELET # BLD AUTO: 242 K/UL — SIGNIFICANT CHANGE UP (ref 150–400)
POTASSIUM SERPL-MCNC: 3.9 MMOL/L — SIGNIFICANT CHANGE UP (ref 3.5–5.3)
POTASSIUM SERPL-SCNC: 3.9 MMOL/L — SIGNIFICANT CHANGE UP (ref 3.5–5.3)
RBC # BLD: 4.42 M/UL — SIGNIFICANT CHANGE UP (ref 3.8–5.2)
RBC # FLD: 14.6 % — HIGH (ref 10.3–14.5)
SODIUM SERPL-SCNC: 135 MMOL/L — SIGNIFICANT CHANGE UP (ref 135–145)
WBC # BLD: 10.52 K/UL — HIGH (ref 3.8–10.5)
WBC # FLD AUTO: 10.52 K/UL — HIGH (ref 3.8–10.5)

## 2023-06-30 PROCEDURE — 82962 GLUCOSE BLOOD TEST: CPT

## 2023-06-30 PROCEDURE — 80048 BASIC METABOLIC PNL TOTAL CA: CPT

## 2023-06-30 PROCEDURE — 88341 IMHCHEM/IMCYTCHM EA ADD ANTB: CPT

## 2023-06-30 PROCEDURE — 83735 ASSAY OF MAGNESIUM: CPT

## 2023-06-30 PROCEDURE — C1889: CPT

## 2023-06-30 PROCEDURE — 88307 TISSUE EXAM BY PATHOLOGIST: CPT

## 2023-06-30 PROCEDURE — 84100 ASSAY OF PHOSPHORUS: CPT

## 2023-06-30 PROCEDURE — 85025 COMPLETE CBC W/AUTO DIFF WBC: CPT

## 2023-06-30 PROCEDURE — 86901 BLOOD TYPING SEROLOGIC RH(D): CPT

## 2023-06-30 PROCEDURE — 86900 BLOOD TYPING SEROLOGIC ABO: CPT

## 2023-06-30 PROCEDURE — C9399: CPT

## 2023-06-30 PROCEDURE — 86850 RBC ANTIBODY SCREEN: CPT

## 2023-06-30 RX ORDER — POTASSIUM CHLORIDE 20 MEQ
40 PACKET (EA) ORAL ONCE
Refills: 0 | Status: DISCONTINUED | OUTPATIENT
Start: 2023-06-30 | End: 2023-06-30

## 2023-06-30 RX ADMIN — Medication 30 MILLIGRAM(S): at 01:26

## 2023-06-30 RX ADMIN — Medication 30 MILLIGRAM(S): at 08:35

## 2023-06-30 RX ADMIN — Medication 400 MILLIGRAM(S): at 05:55

## 2023-06-30 RX ADMIN — Medication 1000 MILLIGRAM(S): at 06:25

## 2023-06-30 RX ADMIN — ONDANSETRON 4 MILLIGRAM(S): 8 TABLET, FILM COATED ORAL at 05:55

## 2023-06-30 RX ADMIN — ONDANSETRON 4 MILLIGRAM(S): 8 TABLET, FILM COATED ORAL at 01:25

## 2023-06-30 RX ADMIN — PANTOPRAZOLE SODIUM 40 MILLIGRAM(S): 20 TABLET, DELAYED RELEASE ORAL at 09:28

## 2023-06-30 RX ADMIN — Medication 30 MILLIGRAM(S): at 01:56

## 2023-06-30 RX ADMIN — HEPARIN SODIUM 5000 UNIT(S): 5000 INJECTION INTRAVENOUS; SUBCUTANEOUS at 05:55

## 2023-06-30 RX ADMIN — Medication 0.12 MILLIGRAM(S): at 05:55

## 2023-06-30 NOTE — DIETITIAN INITIAL EVALUATION ADULT - PERTINENT LABORATORY DATA
06-30    135  |  101  |  10  ----------------------------<  137<H>  3.9   |  23  |  0.89    Ca    9.1      30 Jun 2023 09:17  Phos  3.3     06-29  Mg     1.9     06-29    A1C with Estimated Average Glucose Result: 7.1 % (06-08-23 @ 11:02)

## 2023-06-30 NOTE — DIETITIAN INITIAL EVALUATION ADULT - NS FNS DIET ORDER
Diet, Clear Liquid:   Bariatric Clear Liquid (BARICLLIQ)     Special Instructions for Nursing:  Bariatric Clear Liquid,  start 6 hours postop if no nausea vomiting (06-29-23 @ 09:03) [Active]

## 2023-06-30 NOTE — PHARMACOTHERAPY INTERVENTION NOTE - COMMENTS
S/p sleeve gastrectomy on ????? /2023.  Patient medication reconciliation completed. Patient currently taking:     Home Medications:  atorvastatin 20 mg oral tablet: 1 tablet orally once a day  metFORMIN 500 mg oral tablet: 1 tablet orally once a day  Norvasc 5 mg oral tablet: 1 tablet orally once a day (recently prescribe telmisartan/amlodipine 40mg/5mg daily as a replacement)  HCTZ 25mg by mouth daily  Biofreeze topical PRN pain    Patient was instructed to use crushed, dissolvable, chewable, or liquid formulations of medications for 1 month after surgery. Patient was informed to take daily multivitamins post surgically. Patient reeducated on NSAID avoidance (ibuprofen, ASA, naproxen, aleve) as they increase risk of GI bleeding; may use APAP for mild pain otherwise contact prescriber for consult. Patient was informed on indications and directions for administration for acetaminophen liquid, hyoscyamine SL, ondansetron ODT, lovenox SQ, and omeprazole DR. Patient was instructed to take the medications as follows:     Crush atorvastatin, metformin, telmisartan/amlodipine tabs  Stop HCTZ tabs. Patient informed that diuretics may increase risk of dehydration and to monitor while off diuretics.   Continue topical meds as directed    Reji Velásquez, PharmD, BCPS  [phone]  Available on [employer] Teams    S/p sleeve gastrectomy on ????? /2023.  Patient medication reconciliation completed. Patient currently taking:     Home Medications:  atorvastatin 20 mg oral tablet: 1 tablet orally once a day  metFORMIN 500 mg oral tablet: 1 tablet orally once a day  Norvasc 5 mg oral tablet: 1 tablet orally once a day (recently prescribe telmisartan/amlodipine 40mg/5mg daily as a replacement)  HCTZ 25mg by mouth daily  Biofreeze topical PRN pain    Patient was instructed to use crushed, dissolvable, chewable, or liquid formulations of medications for 1 month after surgery. Patient was informed to take daily multivitamins post surgically. Patient reeducated on NSAID avoidance (ibuprofen, ASA, naproxen, aleve) as they increase risk of GI bleeding; may use APAP for mild pain otherwise contact prescriber for consult. Patient was informed on indications and directions for administration for acetaminophen liquid, hyoscyamine SL, ondansetron ODT, lovenox SQ, and omeprazole DR. Pill  provided. Patient was instructed to take the medications as follows:     Crush atorvastatin, metformin, telmisartan/amlodipine tabs (continue monitoring BP at home)  Stop HCTZ tabs. Patient informed that diuretics may increase risk of dehydration and to monitor while off diuretics.   Continue topical meds as directed    Reji Velásquez, PharmD, BCPS  [phone]  Available on [employer] Teams    S/p sleeve gastrectomy on 6/29/2023.  Patient medication reconciliation completed. Patient currently taking:     Home Medications:  atorvastatin 20 mg oral tablet: 1 tablet orally once a day  metFORMIN 500 mg oral tablet: 1 tablet orally once a day  Norvasc 5 mg oral tablet: 1 tablet orally once a day (recently prescribe telmisartan/amlodipine 40mg/5mg daily as a replacement)  HCTZ 25mg by mouth daily  Biofreeze topical PRN pain    Patient was instructed to use crushed, dissolvable, chewable, or liquid formulations of medications for 1 month after surgery. Patient was informed to take daily multivitamins post surgically. Patient reeducated on NSAID avoidance (ibuprofen, ASA, naproxen, aleve) as they increase risk of GI bleeding; may use APAP for mild pain otherwise contact prescriber for consult. Patient was informed on indications and directions for administration for acetaminophen liquid, hyoscyamine SL, ondansetron ODT, lovenox SQ, and omeprazole DR. Pill  provided. Patient was instructed to take the medications as follows:     Crush atorvastatin, metformin, telmisartan/amlodipine tabs (continue monitoring BP at home)  Stop HCTZ tabs. Patient informed that diuretics may increase risk of dehydration and to monitor while off diuretics.   Continue topical meds as directed    Reji Velásquez, PharmD, BCPS  [phone]  Available on [employer] Teams

## 2023-06-30 NOTE — PROGRESS NOTE ADULT - SUBJECTIVE AND OBJECTIVE BOX
Surgery Daily Progress Note  =====================================================  SUBJECTIVE: A 62y Female Patient seen and examined at bedside on AM rounds.     ALLERGIES:  No Known Allergies      --------------------------------------------------------------------------------------    MEDICATIONS:    Neurologic Medications  acetaminophen   IVPB .. 1000 milliGRAM(s) IV Intermittent once  acetaminophen   IVPB .. 1000 milliGRAM(s) IV Intermittent once  ketorolac   Injectable 30 milliGRAM(s) IV Push every 6 hours  ondansetron Injectable 4 milliGRAM(s) IV Push every 6 hours    Respiratory Medications    Cardiovascular Medications    Gastrointestinal Medications  folic acid Injectable 1 milliGRAM(s) SubCutaneous daily  hyoscyamine SL 0.125 milliGRAM(s) SubLingual every 4 hours PRN Gastric Spasms  lactated ringers. 1000 milliLiter(s) IV Continuous <Continuous>  pantoprazole  Injectable 40 milliGRAM(s) IV Push every 24 hours    Genitourinary Medications    Hematologic/Oncologic Medications  heparin   Injectable 5000 Unit(s) SubCutaneous every 8 hours    Antimicrobial/Immunologic Medications    Endocrine/Metabolic Medications    Topical/Other Medications    --------------------------------------------------------------------------------------    VITAL SIGNS:  No Known Allergies      T(C): 36.8 (06-29-23 @ 23:50), Max: 37.1 (06-29-23 @ 17:00)  HR: 88 (06-29-23 @ 23:50) (60 - 100)  BP: 128/77 (06-29-23 @ 23:50) (123/77 - 180/72)  RR: 18 (06-29-23 @ 23:50) (16 - 18)  SpO2: 98% (06-29-23 @ 23:50) (94% - 100%)  --------------------------------------------------------------------------------------    EXAM    General: NAD, resting in bed comfortably.  Cardiac: regular rate, warm and well perfused  Respiratory: Nonlabored respirations, normal cw expansion.  Abdomen:  Extremities: normal strength, FROM, no deformities    --------------------------------------------------------------------------------------    I&Os  T(C): 36.8 (06-29-23 @ 23:50), Max: 37.1 (06-29-23 @ 17:00)  HR: 88 (06-29-23 @ 23:50) (60 - 100)  BP: 128/77 (06-29-23 @ 23:50) (123/77 - 180/72)  RR: 18 (06-29-23 @ 23:50) (16 - 18)  SpO2: 98% (06-29-23 @ 23:50) (94% - 100%)  --------------------------------------------------------------------------------------    LABS                          12.8   7.90  )-----------( 260      ( 29 Jun 2023 10:50 )             40.8     06-29    137  |  99  |  9   ----------------------------<  181<H>  3.6   |  19<L>  |  0.82    Ca    9.5      29 Jun 2023 10:50  Phos  3.3     06-29  Mg     1.9     06-29        Urinalysis Basic - ( 29 Jun 2023 10:50 )    Color: x / Appearance: x / SG: x / pH: x  Gluc: 181 mg/dL / Ketone: x  / Bili: x / Urobili: x   Blood: x / Protein: x / Nitrite: x   Leuk Esterase: x / RBC: x / WBC x   Sq Epi: x / Non Sq Epi: x / Bacteria: x        06-29-23 @ 07:01  -  06-30-23 @ 00:17  --------------------------------------------------------  IN: 1495 mL / OUT: 3600 mL / NET: -2105 mL      acetaminophen   IVPB .. 1000 milliGRAM(s) IV Intermittent once  acetaminophen   IVPB .. 1000 milliGRAM(s) IV Intermittent once  folic acid Injectable 1 milliGRAM(s) SubCutaneous daily  heparin   Injectable 5000 Unit(s) SubCutaneous every 8 hours  hyoscyamine SL 0.125 milliGRAM(s) SubLingual every 4 hours PRN  ketorolac   Injectable 30 milliGRAM(s) IV Push every 6 hours  lactated ringers. 1000 milliLiter(s) IV Continuous <Continuous>  ondansetron Injectable 4 milliGRAM(s) IV Push every 6 hours  pantoprazole  Injectable 40 milliGRAM(s) IV Push every 24 hours      RADIOLOGY, EKG & ADDITIONAL TESTS: Reviewed.  Surgery Daily Progress Note  =====================================================  SUBJECTIVE: A 62y Female Patient seen and examined at bedside on AM rounds.     ALLERGIES:  No Known Allergies      --------------------------------------------------------------------------------------    MEDICATIONS:    Neurologic Medications  acetaminophen   IVPB .. 1000 milliGRAM(s) IV Intermittent once  acetaminophen   IVPB .. 1000 milliGRAM(s) IV Intermittent once  ketorolac   Injectable 30 milliGRAM(s) IV Push every 6 hours  ondansetron Injectable 4 milliGRAM(s) IV Push every 6 hours    Respiratory Medications    Cardiovascular Medications    Gastrointestinal Medications  folic acid Injectable 1 milliGRAM(s) SubCutaneous daily  hyoscyamine SL 0.125 milliGRAM(s) SubLingual every 4 hours PRN Gastric Spasms  lactated ringers. 1000 milliLiter(s) IV Continuous <Continuous>  pantoprazole  Injectable 40 milliGRAM(s) IV Push every 24 hours    Genitourinary Medications    Hematologic/Oncologic Medications  heparin   Injectable 5000 Unit(s) SubCutaneous every 8 hours    Antimicrobial/Immunologic Medications    Endocrine/Metabolic Medications    Topical/Other Medications    --------------------------------------------------------------------------------------    VITAL SIGNS:  No Known Allergies      T(C): 36.8 (06-29-23 @ 23:50), Max: 37.1 (06-29-23 @ 17:00)  HR: 88 (06-29-23 @ 23:50) (60 - 100)  BP: 128/77 (06-29-23 @ 23:50) (123/77 - 180/72)  RR: 18 (06-29-23 @ 23:50) (16 - 18)  SpO2: 98% (06-29-23 @ 23:50) (94% - 100%)  --------------------------------------------------------------------------------------    EXAM    General: NAD, resting in bed comfortably.  Cardiac: regular rate, warm and well perfused  Respiratory: Nonlabored respirations, normal cw expansion.  Abdomen: soft, nontender, nondistended, opsites c/d/i  Extremities: normal strength, FROM, no deformities    --------------------------------------------------------------------------------------    I&Os  T(C): 36.8 (06-29-23 @ 23:50), Max: 37.1 (06-29-23 @ 17:00)  HR: 88 (06-29-23 @ 23:50) (60 - 100)  BP: 128/77 (06-29-23 @ 23:50) (123/77 - 180/72)  RR: 18 (06-29-23 @ 23:50) (16 - 18)  SpO2: 98% (06-29-23 @ 23:50) (94% - 100%)  --------------------------------------------------------------------------------------    LABS                          12.8   7.90  )-----------( 260      ( 29 Jun 2023 10:50 )             40.8     06-29    137  |  99  |  9   ----------------------------<  181<H>  3.6   |  19<L>  |  0.82    Ca    9.5      29 Jun 2023 10:50  Phos  3.3     06-29  Mg     1.9     06-29        Urinalysis Basic - ( 29 Jun 2023 10:50 )    Color: x / Appearance: x / SG: x / pH: x  Gluc: 181 mg/dL / Ketone: x  / Bili: x / Urobili: x   Blood: x / Protein: x / Nitrite: x   Leuk Esterase: x / RBC: x / WBC x   Sq Epi: x / Non Sq Epi: x / Bacteria: x        06-29-23 @ 07:01  -  06-30-23 @ 00:17  --------------------------------------------------------  IN: 1495 mL / OUT: 3600 mL / NET: -2105 mL      acetaminophen   IVPB .. 1000 milliGRAM(s) IV Intermittent once  acetaminophen   IVPB .. 1000 milliGRAM(s) IV Intermittent once  folic acid Injectable 1 milliGRAM(s) SubCutaneous daily  heparin   Injectable 5000 Unit(s) SubCutaneous every 8 hours  hyoscyamine SL 0.125 milliGRAM(s) SubLingual every 4 hours PRN  ketorolac   Injectable 30 milliGRAM(s) IV Push every 6 hours  lactated ringers. 1000 milliLiter(s) IV Continuous <Continuous>  ondansetron Injectable 4 milliGRAM(s) IV Push every 6 hours  pantoprazole  Injectable 40 milliGRAM(s) IV Push every 24 hours      RADIOLOGY, EKG & ADDITIONAL TESTS: Reviewed.  Surgery Daily Progress Note  =====================================================  SUBJECTIVE: A 62y Female Patient seen and examined at bedside on AM rounds. Tolerating cups of liquid.  States she is ready to go home. Denies nausea/vomiting.  States pain is controlled.     ALLERGIES:  No Known Allergies      --------------------------------------------------------------------------------------    MEDICATIONS:    Neurologic Medications  acetaminophen   IVPB .. 1000 milliGRAM(s) IV Intermittent once  acetaminophen   IVPB .. 1000 milliGRAM(s) IV Intermittent once  ketorolac   Injectable 30 milliGRAM(s) IV Push every 6 hours  ondansetron Injectable 4 milliGRAM(s) IV Push every 6 hours    Respiratory Medications    Cardiovascular Medications    Gastrointestinal Medications  folic acid Injectable 1 milliGRAM(s) SubCutaneous daily  hyoscyamine SL 0.125 milliGRAM(s) SubLingual every 4 hours PRN Gastric Spasms  lactated ringers. 1000 milliLiter(s) IV Continuous <Continuous>  pantoprazole  Injectable 40 milliGRAM(s) IV Push every 24 hours    Genitourinary Medications    Hematologic/Oncologic Medications  heparin   Injectable 5000 Unit(s) SubCutaneous every 8 hours    Antimicrobial/Immunologic Medications    Endocrine/Metabolic Medications    Topical/Other Medications    --------------------------------------------------------------------------------------    VITAL SIGNS:  No Known Allergies      T(C): 36.8 (06-29-23 @ 23:50), Max: 37.1 (06-29-23 @ 17:00)  HR: 88 (06-29-23 @ 23:50) (60 - 100)  BP: 128/77 (06-29-23 @ 23:50) (123/77 - 180/72)  RR: 18 (06-29-23 @ 23:50) (16 - 18)  SpO2: 98% (06-29-23 @ 23:50) (94% - 100%)  --------------------------------------------------------------------------------------    EXAM    General: NAD, resting in bed comfortably.  Cardiac: regular rate, warm and well perfused  Respiratory: Nonlabored respirations, normal cw expansion.  Abdomen: soft, nontender, nondistended, opsites c/d/i  Extremities: normal strength, FROM, no deformities    --------------------------------------------------------------------------------------    I&Os  T(C): 36.8 (06-29-23 @ 23:50), Max: 37.1 (06-29-23 @ 17:00)  HR: 88 (06-29-23 @ 23:50) (60 - 100)  BP: 128/77 (06-29-23 @ 23:50) (123/77 - 180/72)  RR: 18 (06-29-23 @ 23:50) (16 - 18)  SpO2: 98% (06-29-23 @ 23:50) (94% - 100%)  --------------------------------------------------------------------------------------    LABS                          12.8   7.90  )-----------( 260      ( 29 Jun 2023 10:50 )             40.8     06-29    137  |  99  |  9   ----------------------------<  181<H>  3.6   |  19<L>  |  0.82    Ca    9.5      29 Jun 2023 10:50  Phos  3.3     06-29  Mg     1.9     06-29        Urinalysis Basic - ( 29 Jun 2023 10:50 )    Color: x / Appearance: x / SG: x / pH: x  Gluc: 181 mg/dL / Ketone: x  / Bili: x / Urobili: x   Blood: x / Protein: x / Nitrite: x   Leuk Esterase: x / RBC: x / WBC x   Sq Epi: x / Non Sq Epi: x / Bacteria: x        06-29-23 @ 07:01  -  06-30-23 @ 00:17  --------------------------------------------------------  IN: 1495 mL / OUT: 3600 mL / NET: -2105 mL      acetaminophen   IVPB .. 1000 milliGRAM(s) IV Intermittent once  acetaminophen   IVPB .. 1000 milliGRAM(s) IV Intermittent once  folic acid Injectable 1 milliGRAM(s) SubCutaneous daily  heparin   Injectable 5000 Unit(s) SubCutaneous every 8 hours  hyoscyamine SL 0.125 milliGRAM(s) SubLingual every 4 hours PRN  ketorolac   Injectable 30 milliGRAM(s) IV Push every 6 hours  lactated ringers. 1000 milliLiter(s) IV Continuous <Continuous>  ondansetron Injectable 4 milliGRAM(s) IV Push every 6 hours  pantoprazole  Injectable 40 milliGRAM(s) IV Push every 24 hours      RADIOLOGY, EKG & ADDITIONAL TESTS: Reviewed.

## 2023-06-30 NOTE — DIETITIAN INITIAL EVALUATION ADULT - PERTINENT MEDS FT
MEDICATIONS  (STANDING):  acetaminophen   IVPB .. 1000 milliGRAM(s) IV Intermittent once  folic acid Injectable 1 milliGRAM(s) SubCutaneous daily  heparin   Injectable 5000 Unit(s) SubCutaneous every 8 hours  lactated ringers. 1000 milliLiter(s) (150 mL/Hr) IV Continuous <Continuous>  ondansetron Injectable 4 milliGRAM(s) IV Push every 6 hours  pantoprazole  Injectable 40 milliGRAM(s) IV Push every 24 hours    MEDICATIONS  (PRN):  hyoscyamine SL 0.125 milliGRAM(s) SubLingual every 4 hours PRN Gastric Spasms

## 2023-06-30 NOTE — DIETITIAN INITIAL EVALUATION ADULT - ORAL INTAKE PTA/DIET HISTORY
Pt reports following a full liquid diet for 2 weeks PTA as instructed by outpatient RD. Pt reports having [employer]akes, soups, yogurt. NKFA. Pt denies chewing/swallowing difficulty, nausea, vomiting, diarrhea, constipation.

## 2023-06-30 NOTE — DIETITIAN INITIAL EVALUATION ADULT - REASON FOR ADMISSION
Morbid or severe obesity due to excess calories    Chart reviewed, events noted. This is a " 61 y/o F with PMHx Morbid Obesity, GABY on CPAP, T2DM, HTN, HLD sp lap sleeve 6/29"

## 2023-06-30 NOTE — DISCHARGE NOTE NURSING/CASE MANAGEMENT/SOCIAL WORK - PATIENT PORTAL LINK FT
You can access the FollowMyHealth Patient Portal offered by [facility] by registering at the following website: [email or web address]. By joining [facility]’s FollowMyHealth portal, you will also be able to view your health information using other applications (apps) compatible with our system.

## 2023-06-30 NOTE — PROGRESS NOTE ADULT - ASSESSMENT
ASSESSMENT/PLAN: 61 y/o F with PMHx Morbid Obesity, GABY on CPAP, T2DM, HTN, HLD sp lap sleeve 6/29    - Bariatric protocol  - SQH for VTE ppx  - Adv to clears 6hours post op if no n/v  - LR @150cc/hr  - pain control  - zofran prn for n/v  - OOB/ambulate  - AM labs ASSESSMENT/PLAN: 61 y/o F with PMHx Morbid Obesity, GABY on CPAP, T2DM, HTN, HLD sp lap sleeve 6/29    - Bariatric protocol  - SQH for VTE ppx  - Ryland clears  - LR @150cc/hr  - pain control  - zofran prn for n/v  - OOB/ambulate  - AM labs  - dispo: anticipate discharge if meets ryland criteria

## 2023-07-07 LAB — SURGICAL PATHOLOGY STUDY: SIGNIFICANT CHANGE UP

## 2023-07-14 ENCOUNTER — APPOINTMENT (OUTPATIENT)
Dept: SURGERY | Facility: CLINIC | Age: 63
End: 2023-07-14
Payer: COMMERCIAL

## 2023-07-14 VITALS
RESPIRATION RATE: 16 BRPM | OXYGEN SATURATION: 98 % | DIASTOLIC BLOOD PRESSURE: 84 MMHG | SYSTOLIC BLOOD PRESSURE: 139 MMHG | HEART RATE: 85 BPM | WEIGHT: 275 LBS | HEIGHT: 62 IN | TEMPERATURE: 95.2 F | BODY MASS INDEX: 50.61 KG/M2

## 2023-07-14 PROCEDURE — 99024 POSTOP FOLLOW-UP VISIT: CPT

## 2023-07-14 NOTE — PHYSICAL EXAM
[Obese, well nourished, in no acute distress] : obese, well nourished, in no acute distress [Normal] : affect appropriate [de-identified] : Normal respirations [de-identified] : Soft, nontender, nondistended.  Incisions well-healed without erythema or drainage. [de-identified] : Right wrist with soft swelling overlying dorsal aspect, no palpable mass, no cord, no erythema.  Otherwise no swelling of the upper extremity.  Normal strength and sensation bilaterally.

## 2023-07-14 NOTE — PHYSICAL EXAM
[Obese, well nourished, in no acute distress] : obese, well nourished, in no acute distress [Normal] : affect appropriate [de-identified] : Normal respirations [de-identified] : Soft, nontender, nondistended.  Incisions well-healed without erythema or drainage. [de-identified] : Right wrist with soft swelling overlying dorsal aspect, no palpable mass, no cord, no erythema.  Otherwise no swelling of the upper extremity.  Normal strength and sensation bilaterally.

## 2023-07-14 NOTE — HISTORY OF PRESENT ILLNESS
[de-identified] : Jasmine is a 62-year-old female who presents for initial postoperative visit status post laparoscopic sleeve gastrectomy and hiatal hernia repair on June 29, 2023.\par \par Recovering comfortably without major complaints.  Minimal incisional pain.  No nausea.  Tolerating bariatric liquids, including protein shakes, without dysphagia or GERD.  Ambulating well.  No fevers or chills reported.\par \par She does report some swelling and tenderness over her right wrist.  This is not the site of her IV.  She reports no change in strength or sensation of her hand.  She has no arm swelling.

## 2023-07-14 NOTE — HISTORY OF PRESENT ILLNESS
[de-identified] : Jasmine is a 62-year-old female who presents for initial postoperative visit status post laparoscopic sleeve gastrectomy and hiatal hernia repair on June 29, 2023.\par \par Recovering comfortably without major complaints.  Minimal incisional pain.  No nausea.  Tolerating bariatric liquids, including protein shakes, without dysphagia or GERD.  Ambulating well.  No fevers or chills reported.\par \par She does report some swelling and tenderness over her right wrist.  This is not the site of her IV.  She reports no change in strength or sensation of her hand.  She has no arm swelling.

## 2023-07-14 NOTE — PLAN
[FreeTextEntry1] : [] Advance to pureed foods as per bariatric diet instructions at 2 weeks\par [] emphasized importance of maintaining excellent hydration, monitoring urine output and color, and keeping a bottle of water available at all times\par [] 1-2 protein shakes per day \par [] walk as much as tolerated\par [] warm compresses to dorsal aspect of right wrist TID, elevate hand\par \par F/u 2 weeks

## 2023-07-14 NOTE — ASSESSMENT
[FreeTextEntry1] : 62-year-old female recovering well status post laparoscopic sleeve gastrectomy on 6/29/2023.\par Swelling over right wrist may be secondary to arthritis, versus induration from previous IV site or phlebotomy site.  No evidence of active infection.

## 2023-07-28 ENCOUNTER — APPOINTMENT (OUTPATIENT)
Dept: SURGERY | Facility: CLINIC | Age: 63
End: 2023-07-28
Payer: COMMERCIAL

## 2023-07-28 VITALS
HEIGHT: 64 IN | HEART RATE: 87 BPM | RESPIRATION RATE: 16 BRPM | SYSTOLIC BLOOD PRESSURE: 144 MMHG | OXYGEN SATURATION: 98 % | TEMPERATURE: 94.6 F | BODY MASS INDEX: 46.15 KG/M2 | WEIGHT: 270.31 LBS | DIASTOLIC BLOOD PRESSURE: 88 MMHG

## 2023-07-28 DIAGNOSIS — Z09 ENCOUNTER FOR FOLLOW-UP EXAMINATION AFTER COMPLETED TREATMENT FOR CONDITIONS OTHER THAN MALIGNANT NEOPLASM: ICD-10-CM

## 2023-07-28 LAB
25(OH)D3 SERPL-MCNC: 49.6 NG/ML
ALBUMIN SERPL ELPH-MCNC: 3.9 G/DL
ALP BLD-CCNC: 72 U/L
ALT SERPL-CCNC: 16 U/L
ANION GAP SERPL CALC-SCNC: 11 MMOL/L
AST SERPL-CCNC: 20 U/L
BILIRUB SERPL-MCNC: 0.2 MG/DL
BUN SERPL-MCNC: 14 MG/DL
CALCIUM SERPL-MCNC: 9.4 MG/DL
CHLORIDE SERPL-SCNC: 107 MMOL/L
CO2 SERPL-SCNC: 23 MMOL/L
CREAT SERPL-MCNC: 0.74 MG/DL
EGFR: 91 ML/MIN/1.73M2
FERRITIN SERPL-MCNC: 120 NG/ML
FOLATE SERPL-MCNC: 14.8 NG/ML
GLUCOSE SERPL-MCNC: 105 MG/DL
IRON SATN MFR SERPL: 18 %
IRON SERPL-MCNC: 47 UG/DL
POTASSIUM SERPL-SCNC: 4.3 MMOL/L
PROT SERPL-MCNC: 6.7 G/DL
SODIUM SERPL-SCNC: 142 MMOL/L
TIBC SERPL-MCNC: 264 UG/DL
UIBC SERPL-MCNC: 217 UG/DL
VIT B12 SERPL-MCNC: 895 PG/ML

## 2023-07-28 PROCEDURE — 99024 POSTOP FOLLOW-UP VISIT: CPT

## 2023-07-28 NOTE — HISTORY OF PRESENT ILLNESS
[de-identified] : Jasmine is a 62-year-old female who presents for 1 month postoperative visit status post laparoscopic sleeve gastrectomy and hiatal hernia repair on June 29, 2023. \par She continues to do well, with good weight loss, tolerating soft diet, and walking for exercise.  She reports no abdominal pain or dysphagia.  She has not had any fevers.  She is managing her daily activities without problem.  She continues to take multivitamins.

## 2023-07-28 NOTE — PHYSICAL EXAM
[Obese, well nourished, in no acute distress] : obese, well nourished, in no acute distress [Normal] : affect appropriate [de-identified] : Normal respirations [de-identified] : Soft, nontender, nondistended.  Incisions well-healed without erythema or drainage. [de-identified] : Right wrist with soft swelling overlying dorsal aspect, no palpable mass, no cord, no erythema.  Otherwise no swelling of the upper extremity.  Normal strength and sensation bilaterally.

## 2023-07-28 NOTE — ASSESSMENT
[FreeTextEntry1] : 62-year-old female recovering well status post laparoscopic sleeve gastrectomy on 6/29/2023.\par

## 2023-07-31 LAB
ESTIMATED AVERAGE GLUCOSE: 140 MG/DL
HBA1C MFR BLD HPLC: 6.5 %

## 2023-08-02 LAB — VIT B1 SERPL-MCNC: 87.9 NMOL/L

## 2023-08-07 NOTE — PROCEDURE
How Severe Is It?: mild Is This A New Presentation, Or A Follow-Up?: Follow Up Accutane When Was Accutane Started?: july 3 2023 [FreeTextEntry3] : Viscosupplementation Injection: X-ray evidence of osteoarthritis on this or prior visit and patient has tried OTC's including aspirin, Ibuprofen, Aleve etc or prescription NSAIDS, and/or exercises at home and/ or physical therapy without satisfactory response. \par \par An injection of Euflexxa 2.5ml #2 was injected into the bilateral knee(s) after verbal consent obtained. \par \par Patient has tried OTC's including aspirin, Ibuprofen, Aleve etc or prescription NSAIDS, and/or exercises at home and/ or physical therapy without satisfactory response and Patient has decreased mobility in the joint. The risks, benefits, and alternatives to cortisone injection were explained in full to the patient. Risks outlined include but are not limited to infection, sepsis, bleeding, scarring, skin discoloration, temporary increase in pain, syncopal episode, failure to resolve symptoms, allergic reaction, and symptom recurrence. Patient understands the risks. All questions were answered. After discussion of options, patient requested an injection. Oral informed consent was obtained. Sterile technique was utilized for the procedure including the preparation of the solutions used for the injection. Injection site was prepped with betadine and alcohol. Ethyl chloride sprayed topically. Sterile technique used. Patient tolerated procedure well. \par \par Post Procedure Instructions: Patient was advised to call if redness, pain, or fever occur. Apply ice for 15 min. every 2 hours for the next 12-24 hours as tolerated. Patient was advised to rest the joint(s) for 1-2 days.\par

## 2023-09-18 ENCOUNTER — APPOINTMENT (OUTPATIENT)
Dept: SURGERY | Facility: CLINIC | Age: 63
End: 2023-09-18
Payer: COMMERCIAL

## 2023-09-18 VITALS
DIASTOLIC BLOOD PRESSURE: 80 MMHG | TEMPERATURE: 96.4 F | OXYGEN SATURATION: 99 % | WEIGHT: 252 LBS | HEIGHT: 62 IN | HEART RATE: 77 BPM | RESPIRATION RATE: 16 BRPM | BODY MASS INDEX: 46.38 KG/M2 | SYSTOLIC BLOOD PRESSURE: 121 MMHG

## 2023-09-18 PROCEDURE — 99024 POSTOP FOLLOW-UP VISIT: CPT

## 2023-09-28 LAB
25(OH)D3 SERPL-MCNC: 61.6 NG/ML
ALBUMIN SERPL ELPH-MCNC: 4.2 G/DL
ALP BLD-CCNC: 87 U/L
ALT SERPL-CCNC: 20 U/L
ANION GAP SERPL CALC-SCNC: 12 MMOL/L
AST SERPL-CCNC: 24 U/L
BILIRUB SERPL-MCNC: 0.4 MG/DL
BUN SERPL-MCNC: 14 MG/DL
CALCIUM SERPL-MCNC: 9.6 MG/DL
CHLORIDE SERPL-SCNC: 104 MMOL/L
CO2 SERPL-SCNC: 25 MMOL/L
CREAT SERPL-MCNC: 0.81 MG/DL
EGFR: 82 ML/MIN/1.73M2
ESTIMATED AVERAGE GLUCOSE: 126 MG/DL
FERRITIN SERPL-MCNC: 217 NG/ML
FOLATE SERPL-MCNC: 19.1 NG/ML
GLUCOSE SERPL-MCNC: 103 MG/DL
HBA1C MFR BLD HPLC: 6 %
IRON SATN MFR SERPL: 16 %
IRON SERPL-MCNC: 44 UG/DL
POTASSIUM SERPL-SCNC: 3.7 MMOL/L
PROT SERPL-MCNC: 7.2 G/DL
SODIUM SERPL-SCNC: 141 MMOL/L
TIBC SERPL-MCNC: 272 UG/DL
UIBC SERPL-MCNC: 228 UG/DL
VIT B12 SERPL-MCNC: 1315 PG/ML

## 2023-10-03 LAB — VIT B1 SERPL-MCNC: 116.5 NMOL/L

## 2023-11-13 ENCOUNTER — APPOINTMENT (OUTPATIENT)
Dept: CARDIOLOGY | Facility: CLINIC | Age: 63
End: 2023-11-13
Payer: COMMERCIAL

## 2023-11-13 VITALS
WEIGHT: 253 LBS | RESPIRATION RATE: 16 BRPM | TEMPERATURE: 97.3 F | DIASTOLIC BLOOD PRESSURE: 88 MMHG | HEIGHT: 62 IN | BODY MASS INDEX: 46.56 KG/M2 | SYSTOLIC BLOOD PRESSURE: 145 MMHG | OXYGEN SATURATION: 96 % | HEART RATE: 73 BPM

## 2023-11-13 PROCEDURE — 99214 OFFICE O/P EST MOD 30 MIN: CPT

## 2023-11-13 RX ORDER — OMEPRAZOLE 40 MG/1
40 CAPSULE, DELAYED RELEASE ORAL
Qty: 28 | Refills: 0 | Status: COMPLETED | COMMUNITY
Start: 2022-10-19 | End: 2023-11-13

## 2023-11-13 RX ORDER — HYALURONATE SODIUM 20 MG/2 ML
20 SYRINGE (ML) INTRAARTICULAR
Qty: 6 | Refills: 0 | Status: COMPLETED | COMMUNITY
Start: 2022-08-15 | End: 2023-11-13

## 2023-11-13 RX ORDER — TELMISARTAN AND AMLODIPINE 40; 5 MG/1; MG/1
40-5 TABLET ORAL DAILY
Qty: 90 | Refills: 1 | Status: COMPLETED | COMMUNITY
Start: 2023-06-16 | End: 2023-11-13

## 2023-11-13 RX ORDER — DOXYCYCLINE HYCLATE 100 MG/1
100 CAPSULE ORAL
Qty: 28 | Refills: 0 | Status: COMPLETED | COMMUNITY
Start: 2022-10-19 | End: 2023-11-13

## 2023-11-13 RX ORDER — TELMISARTAN AND HYDROCHLOROTHIAZIDE 40; 12.5 MG/1; MG/1
40-12.5 TABLET ORAL
Qty: 90 | Refills: 1 | Status: ACTIVE | COMMUNITY
Start: 2023-11-13 | End: 1900-01-01

## 2023-11-17 NOTE — PHARMACOTHERAPY INTERVENTION NOTE - OUTCOME
On Call around 750pm - Received a call from Melissa , pt's daughter and said pt is weaker, thinks blood pressure is low due to tamsulosin  and he almost fall but landed in a couch. states pt not eating well . No reports of chest pains, breathing hard. pt is alert that pt had 2-3 bottled water today.  Instructed that she needs to bring pt to ER to be evaluated. She said she didnt really want pt to go to hospital. Informed her of [facility] 's service and agreed, Telephone number given [phone]. Melissa said she will call right away.  Follow-up call , left message  accepted

## 2023-11-22 NOTE — PACU DISCHARGE NOTE - THE ANESTHESIA ORDERS USED IN THE PACU ORDER SET WILL BE DISCONTINUED UPON TRANSFER OF THIS PATIENT
Statement Selected Topical Steroids Counseling: I discussed with the patient that prolonged use of topical steroids can result in the increased appearance of superficial blood vessels (telangiectasias), lightening (hypopigmentation) and thinning of the skin (atrophy).  Patient understands to avoid using high potency steroids in skin folds, the groin or the face.  The patient verbalized understanding of the proper use and possible adverse effects of topical steroids.  All of the patient's questions and concerns were addressed.

## 2023-12-18 ENCOUNTER — APPOINTMENT (OUTPATIENT)
Dept: SURGERY | Facility: CLINIC | Age: 63
End: 2023-12-18

## 2024-02-11 LAB
25(OH)D3 SERPL-MCNC: 48.6 NG/ML
ALBUMIN SERPL ELPH-MCNC: 4.1 G/DL
ALP BLD-CCNC: 87 U/L
ALT SERPL-CCNC: 12 U/L
ANION GAP SERPL CALC-SCNC: 9 MMOL/L
AST SERPL-CCNC: 17 U/L
BASOPHILS # BLD AUTO: 0.02 K/UL
BASOPHILS NFR BLD AUTO: 0.4 %
BILIRUB SERPL-MCNC: 0.2 MG/DL
BUN SERPL-MCNC: 16 MG/DL
CALCIUM SERPL-MCNC: 9.5 MG/DL
CHLORIDE SERPL-SCNC: 105 MMOL/L
CO2 SERPL-SCNC: 26 MMOL/L
CREAT SERPL-MCNC: 0.78 MG/DL
EGFR: 85 ML/MIN/1.73M2
EOSINOPHIL # BLD AUTO: 0.13 K/UL
EOSINOPHIL NFR BLD AUTO: 2.4 %
ESTIMATED AVERAGE GLUCOSE: 123 MG/DL
FERRITIN SERPL-MCNC: 144 NG/ML
FOLATE SERPL-MCNC: 7.4 NG/ML
GLUCOSE SERPL-MCNC: 78 MG/DL
HBA1C MFR BLD HPLC: 5.9 %
HCT VFR BLD CALC: 38.3 %
HGB BLD-MCNC: 12.3 G/DL
IMM GRANULOCYTES NFR BLD AUTO: 0.2 %
IRON SATN MFR SERPL: 18 %
IRON SERPL-MCNC: 50 UG/DL
LYMPHOCYTES # BLD AUTO: 2.27 K/UL
LYMPHOCYTES NFR BLD AUTO: 42.5 %
MAN DIFF?: NORMAL
MCHC RBC-ENTMCNC: 27.7 PG
MCHC RBC-ENTMCNC: 32.1 GM/DL
MCV RBC AUTO: 86.3 FL
MONOCYTES # BLD AUTO: 0.78 K/UL
MONOCYTES NFR BLD AUTO: 14.6 %
NEUTROPHILS # BLD AUTO: 2.13 K/UL
NEUTROPHILS NFR BLD AUTO: 39.9 %
PLATELET # BLD AUTO: 260 K/UL
POTASSIUM SERPL-SCNC: 4 MMOL/L
PROT SERPL-MCNC: 7.2 G/DL
RBC # BLD: 4.44 M/UL
RBC # FLD: 15 %
SODIUM SERPL-SCNC: 140 MMOL/L
TIBC SERPL-MCNC: 280 UG/DL
UIBC SERPL-MCNC: 229 UG/DL
VIT B12 SERPL-MCNC: 1038 PG/ML
WBC # FLD AUTO: 5.34 K/UL

## 2024-02-12 ENCOUNTER — APPOINTMENT (OUTPATIENT)
Dept: SURGERY | Facility: CLINIC | Age: 64
End: 2024-02-12
Payer: COMMERCIAL

## 2024-02-12 VITALS
WEIGHT: 243 LBS | OXYGEN SATURATION: 99 % | RESPIRATION RATE: 16 BRPM | SYSTOLIC BLOOD PRESSURE: 136 MMHG | DIASTOLIC BLOOD PRESSURE: 83 MMHG | HEIGHT: 62 IN | HEART RATE: 89 BPM | TEMPERATURE: 95.5 F | BODY MASS INDEX: 44.72 KG/M2

## 2024-02-12 DIAGNOSIS — Z98.84 BARIATRIC SURGERY STATUS: ICD-10-CM

## 2024-02-12 DIAGNOSIS — E44.1 MILD PROTEIN-CALORIE MALNUTRITION: ICD-10-CM

## 2024-02-12 LAB
ALBUMIN SERPL ELPH-MCNC: 3.9 G/DL
ALP BLD-CCNC: 82 U/L
ALT SERPL-CCNC: 12 U/L
ANION GAP SERPL CALC-SCNC: 11 MMOL/L
AST SERPL-CCNC: 20 U/L
BILIRUB DIRECT SERPL-MCNC: 0.1 MG/DL
BILIRUB INDIRECT SERPL-MCNC: 0.1 MG/DL
BILIRUB SERPL-MCNC: 0.2 MG/DL
BUN SERPL-MCNC: 15 MG/DL
CALCIUM SERPL-MCNC: 9.3 MG/DL
CHLORIDE SERPL-SCNC: 106 MMOL/L
CHOLEST SERPL-MCNC: 183 MG/DL
CO2 SERPL-SCNC: 25 MMOL/L
CREAT SERPL-MCNC: 0.79 MG/DL
EGFR: 83 ML/MIN/1.73M2
ESTIMATED AVERAGE GLUCOSE: 123 MG/DL
GLUCOSE SERPL-MCNC: 79 MG/DL
HBA1C MFR BLD HPLC: 5.9 %
HDLC SERPL-MCNC: 66 MG/DL
LDLC SERPL CALC-MCNC: 98 MG/DL
LDLC SERPL DIRECT ASSAY-MCNC: 86 MG/DL
NONHDLC SERPL-MCNC: 117 MG/DL
POTASSIUM SERPL-SCNC: 4.2 MMOL/L
PROT SERPL-MCNC: 7.2 G/DL
SODIUM SERPL-SCNC: 142 MMOL/L
TRIGL SERPL-MCNC: 112 MG/DL
TSH SERPL-ACNC: 0.79 UIU/ML

## 2024-02-12 PROCEDURE — 99213 OFFICE O/P EST LOW 20 MIN: CPT

## 2024-02-12 NOTE — ASSESSMENT
[FreeTextEntry1] : 63-year-old female doing well status post laparoscopic sleeve gastrectomy on 6/29/2023.

## 2024-02-12 NOTE — PLAN
[FreeTextEntry1] : [] 6-month labs ordered [] cont bariatric diet and consult with nutritionist as needed [] goal 30 min cardiovascular exercise daily, with some weight resistance training as tolerated [] continue multivitamins [] f/u 6 months

## 2024-02-12 NOTE — PHYSICAL EXAM
[Normal] : affect appropriate [de-identified] : Normal respirations [de-identified] : Soft, nontender, nondistended.

## 2024-02-12 NOTE — HISTORY OF PRESENT ILLNESS
[de-identified] : Jasmine is a 63-year-old female who presents for 7.5 month postoperative visit status post laparoscopic sleeve gastrectomy on June 29, 2023. She is slowly but steadily continuing to lose weight.  She feels good about her weight loss, but would like to lose more.  She has had limited physical activity, but recently did buy some light weights/resistance bands to start working out at home.  She feels she maintains a healthful diet and has maintained small portions in her diet.  She reports no abdominal pain, reflux, or dysphagia.

## 2024-02-13 ENCOUNTER — NON-APPOINTMENT (OUTPATIENT)
Age: 64
End: 2024-02-13

## 2024-02-14 LAB — VIT B1 SERPL-MCNC: 40.2 NMOL/L

## 2024-02-28 ENCOUNTER — APPOINTMENT (OUTPATIENT)
Dept: CARDIOLOGY | Facility: CLINIC | Age: 64
End: 2024-02-28
Payer: COMMERCIAL

## 2024-02-28 VITALS
DIASTOLIC BLOOD PRESSURE: 84 MMHG | OXYGEN SATURATION: 94 % | HEART RATE: 89 BPM | BODY MASS INDEX: 44.72 KG/M2 | SYSTOLIC BLOOD PRESSURE: 128 MMHG | TEMPERATURE: 97.3 F | RESPIRATION RATE: 16 BRPM | HEIGHT: 62 IN | WEIGHT: 243 LBS

## 2024-02-28 DIAGNOSIS — I07.1 RHEUMATIC TRICUSPID INSUFFICIENCY: ICD-10-CM

## 2024-02-28 DIAGNOSIS — I08.0 RHEUMATIC DISORDERS OF BOTH MITRAL AND AORTIC VALVES: ICD-10-CM

## 2024-02-28 DIAGNOSIS — E78.5 HYPERLIPIDEMIA, UNSPECIFIED: ICD-10-CM

## 2024-02-28 DIAGNOSIS — E11.9 TYPE 2 DIABETES MELLITUS W/OUT COMPLICATIONS: ICD-10-CM

## 2024-02-28 DIAGNOSIS — E66.01 MORBID (SEVERE) OBESITY DUE TO EXCESS CALORIES: ICD-10-CM

## 2024-02-28 DIAGNOSIS — R06.09 OTHER FORMS OF DYSPNEA: ICD-10-CM

## 2024-02-28 DIAGNOSIS — I10 ESSENTIAL (PRIMARY) HYPERTENSION: ICD-10-CM

## 2024-02-28 PROCEDURE — 99214 OFFICE O/P EST MOD 30 MIN: CPT

## 2024-02-28 PROCEDURE — G2211 COMPLEX E/M VISIT ADD ON: CPT

## 2024-02-29 PROBLEM — E78.5 HYPERLIPIDEMIA: Status: ACTIVE | Noted: 2024-02-29

## 2024-02-29 RX ORDER — ROSUVASTATIN CALCIUM 5 MG/1
5 TABLET, FILM COATED ORAL DAILY
Qty: 90 | Refills: 1 | Status: ACTIVE | COMMUNITY
Start: 2024-02-29 | End: 1900-01-01

## 2024-02-29 NOTE — DISCUSSION/SUMMARY
[FreeTextEntry1] : This is a 63-year-old black female with past medical history significant for status post gastric sleeve surgery in June 2023, non-insulin-dependent diabetes mellitus, hypertension, who comes in for cardio metabolic and follow-up cardiac evaluation. She denies chest pain, shortness of breath, dizziness, palpitations or syncope.  She does complain of occasional dyspnea on exertion. The patient's cardiac risk factors include non-insulin-dependent diabetes mellitus, hypertension, history of smoking. The patient is lost over 50 pounds since her bariatric surgery. She is still focused on exercise, and weight loss.  She is willing to work with our registered dietitian. Lipid panel done in February 9, 2024 demonstrated cholesterol 183, HDL 66, triglycerides 112, LDL calculated 98, LDL direct 86 mg/dL and non-HDL cholesterol 117 mg/dL with hemoglobin A1c of 5.9. I have recommend the patient start Crestor 5 mg daily for primary prevention.  She will have follow-up blood work in 6 to 8 weeks after starting her Crestor therapy. She is encouraged to increase her aerobic activity 40 minutes 4 times per week. I have also recommended that she do resistance band training. The patient had normal exercise stress test August 10, 2022. Echo Doppler examination done August 3, 2022 demonstrated minimal to mild mitral valve regurgitation, mild tricuspid valve regurgitation, satisfactory left ventricular function, mild concentric left ventricular hypertrophy, thinning and dyskinesis of the interatrial septum and normal ejection fraction of 65%. Electrocardiogram done August 1, 2022 demonstrated normal sinus rhythm at a rate of 86 bpm is otherwise remarkable for left atrial abnormality. Blood work done August 1, 2022 demonstrated hemoglobin A1c of 7.0, direct LDL of 105 mg/dL and cholesterol 230 mg/dL, and triglycerides 186 mg/dL. As a diabetic the patient should be on statin therapy. The patient's blood pressure is under good control on telmisartan hydrochlorothiazide 40/12.5 mg daily. The patient understands that aerobic exercises must be increased to 40 minutes 4 times per week. A detailed discussion of lifestyle modification was done today. The patient has a good understanding of the diagnosis, and treatment plan. Lifestyle modification was also outlined.

## 2024-02-29 NOTE — PHYSICAL EXAM
[Well Developed] : well developed [No Acute Distress] : no acute distress [Well Nourished] : well nourished [Normal Conjunctiva] : normal conjunctiva [Normal Venous Pressure] : normal venous pressure [No Carotid Bruit] : no carotid bruit [Normal S1, S2] : normal S1, S2 [No Rub] : no rub [5th Left ICS - MCL] : palpated at the 5th LICS in the midclavicular line [Normal] : normal [No Precordial Heave] : no precordial heave was noted [Rhythm Regular] : regular [Normal Rate] : normal [Normal S1] : normal S1 [Normal S2] : normal S2 [No Gallop] : no gallop heard [I] : a grade 1 [No Pitting Edema] : no pitting edema present [2+] : left 2+ [No Abnormalities] : the abdominal aorta was not enlarged and no bruit was heard [Clear Lung Fields] : clear lung fields [Good Air Entry] : good air entry [No Respiratory Distress] : no respiratory distress  [Soft] : abdomen soft [Non Tender] : non-tender [No Masses/organomegaly] : no masses/organomegaly [Normal Bowel Sounds] : normal bowel sounds [Normal Gait] : normal gait [No Edema] : no edema [No Cyanosis] : no cyanosis [No Clubbing] : no clubbing [No Varicosities] : no varicosities [No Rash] : no rash [No Skin Lesions] : no skin lesions [Moves all extremities] : moves all extremities [No Focal Deficits] : no focal deficits [Normal Speech] : normal speech [Alert and Oriented] : alert and oriented [Normal memory] : normal memory [S3] : no S3 [S4] : no S4 [Click] : no click [Pericardial Rub] : no pericardial rub [Right Carotid Bruit] : no bruit heard over the right carotid [Left Carotid Bruit] : no bruit heard over the left carotid [Right Femoral Bruit] : no bruit heard over the right femoral artery [Bruit] : no bruit heard [Left Femoral Bruit] : no bruit heard over the left femoral artery

## 2024-02-29 NOTE — REASON FOR VISIT
[CV Risk Factors and Non-Cardiac Disease] : CV risk factors and non-cardiac disease [Hypertension] : hypertension [Other: ____] : [unfilled] [FreeTextEntry3] : Dr. Vonnie Douglas [FreeTextEntry1] : This is a 63-year-old female presenting for follow-up cardiac evaluation.  Past medical history significant for non-insulin-dependent diabetes mellitus, hypertension, s/p gastric sleeve (June 2023). Cardiac risk factors include non-insulin-dependent diabetes mellitus, hypertension, history of smoking.  Patient states she is feeling generally well today and denies chest pain, dizziness, heart palpitations, recent episodes of syncope or falls at this time. Patient may feel dyspnea on exertion at times.   She is currently prescribed Telmisartan-HCTZ 40-12.5 mg daily and Amlodipine 5 mg daily.  Labs 2/9/24 demonstrated cholesterol 183, LDL 98, triglycerides 112, HDL 66, A1c 5.9 EKG 6/16/23 demonstrated sinus tachycardia rate 103, left atrial enlargement, otherwise unremarkable.  Stress test 8/10/22 demonstrated normal results.  TTE 8/3/22 demonstrated: - normal left ventricular function with ejection fraction 65% - mildly thickened and calcified aortic valve - mild concentric left ventricular hypertrophy, sigmoid basal inter-ventricular septum - mitral annular calcification - mild tricuspid regurgitation, minimal to mild mitral regurgitation - thinning and dyskinesis of the inter-atrial septum

## 2024-02-29 NOTE — ASSESSMENT
[FreeTextEntry1] : Prior note nurse practitioner Scott November 13, 2023::  This is a 63-year-old black female with past medical history significant for non-insulin-dependent diabetes mellitus, hypertension, s/p gastric sleeve (June 2023) who comes in for cardiac follow-up evaluation.   The patient's cardiac risk factors include non-insulin-dependent diabetes mellitus, hypertension, history of smoking.   HPI: She is feeling generally well today and denies chest pain, dizziness, heart palpitations, recent episodes of syncope or falls, SOB, or dyspnea at this time.  She is currently prescribed Telmisartan-Amlodipine 40-5 mg daily and Hydrochlorothiazide 25 mg daily.  **States she stopped taking her Hydrochlorothiazide 25 mg daily after last visit because she thought it had been discontinued when changing to Telmisartan-Amlodipine 40-5 mg daily. I've discussed the need to restart this medication in the setting of her elevated BP today.   BLOOD PRESSURE: -Elevated on today's visit. -Discussed restarting HCTZ 12.5 mg daily for additional blood pressure management.   BLOOD WORK: -Script placed for patient to evaluate lipid profile, CBC, BMP, hepatic function, A1C and TSH before next follow-up appointment.  -Blood work was done 06/16/2023 which demonstrated triglycerides 133, cholesterol 166, HDL 62, LDL 78, Non-   TESTING/REPORTS: -EKG done June 16, 2023 which demonstrated regular sinus rhythm with nonspecific ST-T wave changes, BPM of 103 -HR rechecked and is 93 on exam.   -The patient had normal exercise stress test August 10, 2022.  -Echo Doppler examination done August 3, 2022 demonstrated minimal to mild mitral valve regurgitation, mild tricuspid valve regurgitation, satisfactory left ventricular function, mild concentric left ventricular hypertrophy, thinning and dyskinesis of the interatrial septum and normal ejection fraction of 65%.  -Electrocardiogram done August 1, 2022 demonstrated normal sinus rhythm at a rate of 86 bpm is otherwise remarkable for left atrial abnormality.   PLAN: -She will change Telmisartan-Amlodipine 40-5 mg daily to Telmisartan-HCTZ 40-12.5 mg daily.  -She will change her Amlodipine to 5 mg at bedtime daily.  -She will continue with her other usual medications and will contact the office if she is having any complaints between now and their next follow up appointment.  -She will repeat her bloodwork before next appointment.   -Patient will schedule echocardiogram doppler examination to evaluate murmur, left ventricular function, chamber size, and rule out hypertrophy.   -She will schedule exercise stress test to rule out significant coronary artery disease.    I have discussed the plan of care with Ms. VESNA WEST and she will follow up in 1 month. She is compliant with all of her medications.  The patient understands that aerobic exercises must be increased to at least 40 minutes 4 times/week along with maintaining lifestyle modifications including well-maintained diet. She will contact me at the office for any questions with their care or any changes in their health status.  Dr Grajeda, supervising physician, was present in the office with LOI Chavez NP during the arreola portions of the history and exam. The plan was discussed in detail as documented in the note.  LOI Chavez NP

## 2024-03-07 RX ORDER — AMLODIPINE BESYLATE 5 MG/1
5 TABLET ORAL
Qty: 90 | Refills: 1 | Status: DISCONTINUED | COMMUNITY
Start: 2023-11-13 | End: 2024-03-07

## 2024-06-18 ENCOUNTER — APPOINTMENT (OUTPATIENT)
Dept: CARDIOLOGY | Facility: CLINIC | Age: 64
End: 2024-06-18

## 2024-06-19 NOTE — PLAN
Pharmacokinetic Assessment Follow Up: IV Vancomycin    Vancomycin serum concentration assessment(s):    The random level was drawn correctly and can be used to guide therapy at this time. The measurement is above the desired definitive target range of 15 to 20 mcg/mL.    Vancomycin Regimen Plan:    Re-dose when the random level is less than 20 mcg/mL, next level to be drawn at 0430 on 6/20.    Drug levels (last 3 results):  Recent Labs   Lab Result Units 06/17/24  0337 06/18/24 0224 06/19/24  0517   Vancomycin Random ug/ml 37.5* 33.6* 23.9*       Pharmacy will continue to follow and monitor vancomycin.    Please contact pharmacy at extension [phone] for questions regarding this assessment.    Thank you for the consult,   Della Gonzalez       Patient brief summary:  Greer Kahn is a 56 y.o. female initiated on antimicrobial therapy with IV Vancomycin for treatment of skin & soft tissue infection.    Drug Allergies:   Review of patient's allergies indicates:  No Known Allergies    Actual Body Weight:   108.9 kg    Renal Function:   Estimated Creatinine Clearance: 19.6 mL/min (A) (based on SCr of 4.03 mg/dL (H)).,     Dialysis Method (if applicable):  intermittent HD x1 today.    CBC (last 72 hours):  Recent Labs   Lab Result Units 06/17/24  0337 06/18/24 0757 06/19/24  0517   WBC x10(3)/mcL 22.65  22.65* 20.99* 26.7  26.72*   Hgb g/dL 7.9* 7.6* 8.3*   Hct % 23.7* 23.1* 24.5*   Platelet x10(3)/mcL 321 370 386   Monocytes % % 8  --  7   Eosinophils % % 1  --  2   Basophils % %  --   --  1       Metabolic Panel (last 72 hours):  Recent Labs   Lab Result Units 06/16/24  0942 06/17/24  0337 06/18/24 0224 06/18/24 0757 06/19/24  0517   Sodium mmol/L 134* 133* 132* 131* 132*   Potassium mmol/L 3.6 3.8 3.7 4.1 4.0   Chloride mmol/L 97* 97* 95* 95* 100   CO2 mmol/L 22 23 24 23 23   Glucose mg/dL 222* 89 78 69* 81   Blood Urea Nitrogen mg/dL 26.3* 33.0* 32.0* 34.6* 25.1*   Creatinine mg/dL 4.53* 5.27* 4.79* 5.08* 4.03*    Albumin g/dL  --  2.0* 1.9* 1.8* 1.8*   Bilirubin Total mg/dL  --  3.7* 3.6* 3.7* 3.3*   ALP unit/L  --  123 131 126 129   AST unit/L  --  27 25 23 17   ALT unit/L  --  32 30 27 22       Vancomycin Administrations:  vancomycin given in the last 96 hours        No antibiotic orders with administrations found.                    Microbiologic Results:  Microbiology Results (last 7 days)       Procedure Component Value Units Date/Time    Gram Stain [[id number]] Collected: 06/18/24 1223    Order Status: Completed Specimen: Tissue from Groin Updated: 06/18/24 1431     GRAM STAIN Few WBC observed      Rare Gram positive cocci    Anaerobic Culture [[id number]] Collected: 06/18/24 1223    Order Status: Sent Specimen: Tissue from Groin Updated: 06/18/24 1407    Blood culture #1 **CANNOT BE ORDERED STAT** [[id number]]  (Normal) Collected: 06/13/24 0832    Order Status: Completed Specimen: Blood from Hand, Left Updated: 06/18/24 1300     Blood Culture No Growth at 5 days    Blood culture #2 **CANNOT BE ORDERED STAT** [[id number]]  (Normal) Collected: 06/13/24 0832    Order Status: Completed Specimen: Blood from Antecubital, Left Updated: 06/18/24 1300     Blood Culture No Growth at 5 days    Tissue Culture - Aerobic [[id number]] Collected: 06/18/24 1223    Order Status: Resulted Specimen: Tissue from Groin Updated: 06/18/24 1233    Fungal Culture [[id number]] Collected: 06/18/24 1223    Order Status: Sent Specimen: Tissue from Groin Updated: 06/18/24 1233    Wound Culture [[id number]]  (Abnormal)  (Susceptibility) Collected: 06/13/24 0917    Order Status: Completed Specimen: Abscess from Perineum Updated: 06/15/24 0903     Wound Culture Moderate Methicillin resistant Staphylococcus aureus           [FreeTextEntry1] : [] continue bariatric diet advancement per instructions\par [] reviewed importance of eating slowly, chewing thoroughly, stop eating when full\par [] recommend increasing cardiovascular exercise, goal 30 minutes per day\par [] continue multivitamins\par [] check nutrition panel\par [] f/u in 2 months

## 2024-07-29 ENCOUNTER — APPOINTMENT (OUTPATIENT)
Dept: OBGYN | Facility: CLINIC | Age: 64
End: 2024-07-29
Payer: COMMERCIAL

## 2024-07-29 VITALS — DIASTOLIC BLOOD PRESSURE: 82 MMHG | BODY MASS INDEX: 44.99 KG/M2 | SYSTOLIC BLOOD PRESSURE: 115 MMHG | WEIGHT: 246 LBS

## 2024-07-29 DIAGNOSIS — Z01.419 ENCOUNTER FOR GYNECOLOGICAL EXAMINATION (GENERAL) (ROUTINE) W/OUT ABNORMAL FINDINGS: ICD-10-CM

## 2024-07-29 DIAGNOSIS — Z83.3 FAMILY HISTORY OF DIABETES MELLITUS: ICD-10-CM

## 2024-07-29 DIAGNOSIS — Z84.1 FAMILY HISTORY OF DISORDERS OF KIDNEY AND URETER: ICD-10-CM

## 2024-07-29 PROCEDURE — 99386 PREV VISIT NEW AGE 40-64: CPT

## 2024-07-29 NOTE — DISCUSSION/SUMMARY
[FreeTextEntry1] : annual exam   cnronic intermittent PMB, feels likel menses, large fibroids uterus sono office  to decide if office hysteroscopy vs OR  wt loss s/p gastric leeve

## 2024-07-29 NOTE — HISTORY OF PRESENT ILLNESS
[Patient reported colonoscopy was normal] : Patient reported colonoscopy was normal [FreeTextEntry1] : 64yo  P2 PM w  ~ D&C x 2 for PMB, last ~ 1 year ago  a couple months later started w intermently  pt notes PMB again w cramping on left x 8 months one day like a period then spotting  NAVD x 2 [Mammogramdate] : appt  this week [PapSmeardate] : 1-2 years [TextBox_31] : [employer] [ColonoscopyDate] : ~ 2022

## 2024-07-29 NOTE — PHYSICAL EXAM
[Chaperone Declined] : Patient declined chaperone [Appropriately responsive] : appropriately responsive [Alert] : alert [No Acute Distress] : no acute distress [No Lymphadenopathy] : no lymphadenopathy [Soft] : soft [Non-tender] : non-tender [Non-distended] : non-distended [No HSM] : No HSM [No Lesions] : no lesions [No Mass] : no mass [Oriented x3] : oriented x3 [Examination Of The Breasts] : a normal appearance [No Masses] : no breast masses were palpable [Labia Majora] : normal [Labia Minora] : normal [Normal] : normal [Uterine Adnexae] : normal [Scant] : There was scant vaginal bleeding [Tenderness] : tender [Enlarged ___ wks] : enlarged [unfilled] ~Uweeks [FreeTextEntry4] : brown watery discharge

## 2024-08-02 ENCOUNTER — APPOINTMENT (OUTPATIENT)
Dept: SURGERY | Facility: CLINIC | Age: 64
End: 2024-08-02
Payer: COMMERCIAL

## 2024-08-02 ENCOUNTER — APPOINTMENT (OUTPATIENT)
Dept: ULTRASOUND IMAGING | Facility: IMAGING CENTER | Age: 64
End: 2024-08-02
Payer: COMMERCIAL

## 2024-08-02 ENCOUNTER — RESULT REVIEW (OUTPATIENT)
Age: 64
End: 2024-08-02

## 2024-08-02 ENCOUNTER — OUTPATIENT (OUTPATIENT)
Dept: OUTPATIENT SERVICES | Facility: HOSPITAL | Age: 64
LOS: 1 days | End: 2024-08-02
Payer: COMMERCIAL

## 2024-08-02 VITALS
WEIGHT: 239 LBS | SYSTOLIC BLOOD PRESSURE: 135 MMHG | HEART RATE: 80 BPM | RESPIRATION RATE: 16 BRPM | OXYGEN SATURATION: 98 % | DIASTOLIC BLOOD PRESSURE: 81 MMHG | BODY MASS INDEX: 43.98 KG/M2 | HEIGHT: 62 IN | TEMPERATURE: 95.2 F

## 2024-08-02 DIAGNOSIS — Z98.890 OTHER SPECIFIED POSTPROCEDURAL STATES: Chronic | ICD-10-CM

## 2024-08-02 DIAGNOSIS — Z98.84 BARIATRIC SURGERY STATUS: ICD-10-CM

## 2024-08-02 DIAGNOSIS — E44.1 MILD PROTEIN-CALORIE MALNUTRITION: ICD-10-CM

## 2024-08-02 DIAGNOSIS — N95.0 POSTMENOPAUSAL BLEEDING: ICD-10-CM

## 2024-08-02 LAB
CYTOLOGY CVX/VAG DOC THIN PREP: NORMAL
HPV HIGH+LOW RISK DNA PNL CVX: NOT DETECTED

## 2024-08-02 PROCEDURE — 76830 TRANSVAGINAL US NON-OB: CPT | Mod: 26

## 2024-08-02 PROCEDURE — 76856 US EXAM PELVIC COMPLETE: CPT | Mod: 26

## 2024-08-02 PROCEDURE — 76856 US EXAM PELVIC COMPLETE: CPT

## 2024-08-02 PROCEDURE — 76830 TRANSVAGINAL US NON-OB: CPT

## 2024-08-02 PROCEDURE — 99213 OFFICE O/P EST LOW 20 MIN: CPT

## 2024-08-02 NOTE — HISTORY OF PRESENT ILLNESS
[de-identified] : Jasmine is a 63-year-old female who presents for a follow up visit. status post laparoscopic sleeve gastrectomy on June 29, 2023. She is now little over a year after surgery.  She is continue to slowly lose weight over the last several months.  She is now at 239 pounds with a BMI of 43.  She feels happy with her results.  She walks frequently for exercise and does some light resistance training at home.  She feels her diet is healthy and reports no dysphagia or reflux. Recent nutrition panel was unremarkable.  Hemoglobin A1c was up slightly from 5.9-6.0.  Previously, it was as high as 7.

## 2024-08-02 NOTE — HISTORY OF PRESENT ILLNESS
[de-identified] : Jasmine is a 63-year-old female who presents for a follow up visit. status post laparoscopic sleeve gastrectomy on June 29, 2023. She is now little over a year after surgery.  She is continue to slowly lose weight over the last several months.  She is now at 239 pounds with a BMI of 43.  She feels happy with her results.  She walks frequently for exercise and does some light resistance training at home.  She feels her diet is healthy and reports no dysphagia or reflux. Recent nutrition panel was unremarkable.  Hemoglobin A1c was up slightly from 5.9-6.0.  Previously, it was as high as 7.

## 2024-08-02 NOTE — PLAN
[FreeTextEntry1] : [] nutritional labs reviewed [] cont bariatric diet and consult with nutritionist -- referral given [] goal 30 min cardiovascular exercise daily, with some weight resistance training as tolerated [] continue multivitamins [] f/u 1 year

## 2024-08-02 NOTE — PHYSICAL EXAM
[Normal] : affect appropriate [de-identified] : Normal respirations [de-identified] : Soft, nontender, nondistended.

## 2024-08-02 NOTE — PHYSICAL EXAM
[Normal] : affect appropriate [de-identified] : Normal respirations [de-identified] : Soft, nontender, nondistended.

## 2024-08-06 ENCOUNTER — APPOINTMENT (OUTPATIENT)
Dept: SURGERY | Facility: CLINIC | Age: 64
End: 2024-08-06

## 2024-08-06 PROCEDURE — 97802 MEDICAL NUTRITION INDIV IN: CPT | Mod: 95

## 2024-08-07 ENCOUNTER — NON-APPOINTMENT (OUTPATIENT)
Age: 64
End: 2024-08-07

## 2024-08-07 ENCOUNTER — APPOINTMENT (OUTPATIENT)
Dept: OBGYN | Facility: CLINIC | Age: 64
End: 2024-08-07

## 2024-08-07 PROBLEM — N95.0 PMB (POSTMENOPAUSAL BLEEDING): Status: ACTIVE | Noted: 2024-07-29

## 2024-08-07 PROCEDURE — 58558Z: CUSTOM

## 2024-08-07 NOTE — PROCEDURE
[Hysteroscopy] : Hysteroscopy [Time out performed] : Pre-procedure time out performed.  Patient's name, date of birth and procedure confirmed. [Consent Obtained] : Consent obtained [Postmenopausal bleeding] : postmenopausal bleeding [Risks] : risks [Benefits] : benefits [Patient] : patient [Infection] : infection [Bleeding] : bleeding [Ibuprofen ___ mg] : ibuprofen [unfilled] ~Umg [Lidocaine___ mL] : [unfilled] ~UmL of lidocaine [rigid] : Using aseptic technique a hysteroscopy was performed using a rigid hysteroscope [Sent to Pathology] : specimen was placed in buffered formalin and sent for pathology [Antibiotics given] : antibiotics not given [Hemostasis obtained] : hemostasis obtained [Tolerated Well] : Patient tolerated the procedure well [de-identified] : mostly atrophic w tissue at fundus and anterior wall poss small fibroid, submucosal vs tissue  EMB done.  [de-identified] : notable bleeding after procedure, observed x 20 min w reduction, Discharge home stable w instruction on indication to call or RTO.

## 2024-08-27 ENCOUNTER — LABORATORY RESULT (OUTPATIENT)
Age: 64
End: 2024-08-27

## 2024-08-27 ENCOUNTER — NON-APPOINTMENT (OUTPATIENT)
Age: 64
End: 2024-08-27

## 2024-08-27 ENCOUNTER — APPOINTMENT (OUTPATIENT)
Dept: CARDIOLOGY | Facility: CLINIC | Age: 64
End: 2024-08-27
Payer: COMMERCIAL

## 2024-08-27 VITALS
SYSTOLIC BLOOD PRESSURE: 138 MMHG | HEART RATE: 61 BPM | RESPIRATION RATE: 16 BRPM | WEIGHT: 239 LBS | OXYGEN SATURATION: 100 % | TEMPERATURE: 97.5 F | HEIGHT: 62 IN | DIASTOLIC BLOOD PRESSURE: 90 MMHG | BODY MASS INDEX: 43.98 KG/M2

## 2024-08-27 DIAGNOSIS — I10 ESSENTIAL (PRIMARY) HYPERTENSION: ICD-10-CM

## 2024-08-27 DIAGNOSIS — I07.1 RHEUMATIC TRICUSPID INSUFFICIENCY: ICD-10-CM

## 2024-08-27 DIAGNOSIS — I08.0 RHEUMATIC DISORDERS OF BOTH MITRAL AND AORTIC VALVES: ICD-10-CM

## 2024-08-27 DIAGNOSIS — Z13.31 ENCOUNTER FOR SCREENING FOR DEPRESSION: ICD-10-CM

## 2024-08-27 DIAGNOSIS — E11.9 TYPE 2 DIABETES MELLITUS W/OUT COMPLICATIONS: ICD-10-CM

## 2024-08-27 DIAGNOSIS — E78.5 HYPERLIPIDEMIA, UNSPECIFIED: ICD-10-CM

## 2024-08-27 PROCEDURE — G2211 COMPLEX E/M VISIT ADD ON: CPT | Mod: NC

## 2024-08-27 PROCEDURE — 99214 OFFICE O/P EST MOD 30 MIN: CPT

## 2024-08-27 PROCEDURE — G0444 DEPRESSION SCREEN ANNUAL: CPT

## 2024-08-27 PROCEDURE — 93000 ELECTROCARDIOGRAM COMPLETE: CPT | Mod: 59

## 2024-08-27 NOTE — ASSESSMENT
[FreeTextEntry1] : This is a 63-year-old black female with past medical history significant for non-insulin-dependent diabetes mellitus, hypertension, s/p gastric sleeve (June 2023) who comes in for cardiac follow-up evaluation.   The patient's cardiac risk factors include non-insulin-dependent diabetes mellitus, hypertension, history of smoking.   HPI: She is feeling generally well today and denies chest pain, dizziness, heart palpitations, recent episodes of syncope or falls, SOB, or dyspnea at this time.  Current Medications: Telmisartan-Hydrochlorothiazide 40-12.5 mg daily and Rosuvastatin 5 mg daily.   Depression screening completed. Discussed negative results with patient during visit. Patient will follow-up with their primary care provider for mental health management if needed in the future.   BLOOD PRESSURE: -Elevated on today's visit. *Reports not taking her Telmisartan-HCTZ 40-12.5 mg this morning.    BLOOD WORK:  *LDL target goal < 70* -New blood work was done 08/27/2024 to evaluate lipid profile, CBC, BMP, hepatic function, A1C and TSH. -Blood work done February 2024 demonstrated triglyceride 112, cholesterol 183, HDL 66, LDL 86, non-, LDL direct 88. -Blood work was done 06/16/2023 which demonstrated triglycerides 133, cholesterol 166, HDL 62, LDL 78, Non-   TESTING/REPORTS: -EKG done 08/27/2024 demonstrated regular sinus rhythm rate 61 bpm otherwise remarkable for left atrial abnormality.   -Normal exercise stress test done February 2024.   -EKG done June 16, 2023 which demonstrated regular sinus rhythm with nonspecific ST-T wave changes, BPM of 103 -HR rechecked and is 93 on exam.   -The patient had normal exercise stress test August 10, 2022.  -Echo Doppler examination done August 3, 2022 demonstrated minimal to mild mitral valve regurgitation, mild tricuspid valve regurgitation, satisfactory left ventricular function, mild concentric left ventricular hypertrophy, thinning and dyskinesis of the interatrial septum and normal ejection fraction of 65%.  -Electrocardiogram done August 1, 2022 demonstrated normal sinus rhythm at a rate of 86 bpm is otherwise remarkable for left atrial abnormality.   PLAN: -She will continue with her usual medications and will contact the office if she is having any complaints between now and their next follow up appointment. -Patient will schedule echocardiogram doppler examination to evaluate murmur, left ventricular function, chamber size, and rule out hypertrophy.   I have discussed the plan of care with Ms. VESNA WEST and she will follow up in 3-4 months. She is compliant with all of her medications.  The patient understands that aerobic exercises must be increased to at least 40 minutes 4 times/week along with maintaining lifestyle modifications including well-maintained diet. She will contact me at the office for any questions with their care or any changes in their health status.  Dr Grajeda, supervising physician, was present in the office with LOI Chavez NP during the arreola portions of the history and exam. The plan was discussed in detail as documented in the note.  LOI Chavez NP

## 2024-08-27 NOTE — DISCUSSION/SUMMARY
[FreeTextEntry1] : Dr. Grajeda-(PRIOR VISIT and PMH WITH Dr. Grajeda): This is a 63-year-old black female with past medical history significant for status post gastric sleeve surgery in June 2023, non-insulin-dependent diabetes mellitus, hypertension, who comes in for cardio metabolic and follow-up cardiac evaluation. She denies chest pain, shortness of breath, dizziness, palpitations or syncope.  She does complain of occasional dyspnea on exertion. The patient's cardiac risk factors include non-insulin-dependent diabetes mellitus, hypertension, history of smoking. The patient is lost over 50 pounds since her bariatric surgery. She is still focused on exercise, and weight loss.  She is willing to work with our registered dietitian. Lipid panel done in February 9, 2024 demonstrated cholesterol 183, HDL 66, triglycerides 112, LDL calculated 98, LDL direct 86 mg/dL and non-HDL cholesterol 117 mg/dL with hemoglobin A1c of 5.9. I have recommend the patient start Crestor 5 mg daily for primary prevention.  She will have follow-up blood work in 6 to 8 weeks after starting her Crestor therapy. She is encouraged to increase her aerobic activity 40 minutes 4 times per week. I have also recommended that she do resistance band training. The patient had normal exercise stress test August 10, 2022. Echo Doppler examination done August 3, 2022 demonstrated minimal to mild mitral valve regurgitation, mild tricuspid valve regurgitation, satisfactory left ventricular function, mild concentric left ventricular hypertrophy, thinning and dyskinesis of the interatrial septum and normal ejection fraction of 65%. Electrocardiogram done August 1, 2022 demonstrated normal sinus rhythm at a rate of 86 bpm is otherwise remarkable for left atrial abnormality. Blood work done August 1, 2022 demonstrated hemoglobin A1c of 7.0, direct LDL of 105 mg/dL and cholesterol 230 mg/dL, and triglycerides 186 mg/dL. As a diabetic the patient should be on statin therapy. The patient's blood pressure is under good control on telmisartan hydrochlorothiazide 40/12.5 mg daily. The patient understands that aerobic exercises must be increased to 40 minutes 4 times per week. A detailed discussion of lifestyle modification was done today. The patient has a good understanding of the diagnosis, and treatment plan. Lifestyle modification was also outlined.

## 2024-08-27 NOTE — PHYSICAL EXAM
[Well Developed] : well developed [Well Nourished] : well nourished [No Acute Distress] : no acute distress [Normal Conjunctiva] : normal conjunctiva [Normal Venous Pressure] : normal venous pressure [No Carotid Bruit] : no carotid bruit [Normal S1, S2] : normal S1, S2 [No Rub] : no rub [5th Left ICS - MCL] : palpated at the 5th LICS in the midclavicular line [Normal] : normal [No Precordial Heave] : no precordial heave was noted [Normal Rate] : normal [Rhythm Regular] : regular [Normal S1] : normal S1 [Normal S2] : normal S2 [No Gallop] : no gallop heard [I] : a grade 1 [No Pitting Edema] : no pitting edema present [2+] : left 2+ [No Abnormalities] : the abdominal aorta was not enlarged and no bruit was heard [Clear Lung Fields] : clear lung fields [Good Air Entry] : good air entry [No Respiratory Distress] : no respiratory distress  [Soft] : abdomen soft [Non Tender] : non-tender [No Masses/organomegaly] : no masses/organomegaly [Normal Bowel Sounds] : normal bowel sounds [Normal Gait] : normal gait [No Edema] : no edema [No Clubbing] : no clubbing [No Cyanosis] : no cyanosis [No Varicosities] : no varicosities [No Rash] : no rash [No Skin Lesions] : no skin lesions [Moves all extremities] : moves all extremities [No Focal Deficits] : no focal deficits [Normal Speech] : normal speech [Alert and Oriented] : alert and oriented [Normal memory] : normal memory [S3] : no S3 [S4] : no S4 [Click] : no click [Pericardial Rub] : no pericardial rub [Right Carotid Bruit] : no bruit heard over the right carotid [Left Carotid Bruit] : no bruit heard over the left carotid [Right Femoral Bruit] : no bruit heard over the right femoral artery [Left Femoral Bruit] : no bruit heard over the left femoral artery [Bruit] : no bruit heard

## 2024-09-04 RX ORDER — POTASSIUM CHLORIDE 750 MG/1
10 TABLET, FILM COATED, EXTENDED RELEASE ORAL
Qty: 26 | Refills: 1 | Status: ACTIVE | COMMUNITY
Start: 2024-09-04 | End: 1900-01-01

## 2024-11-25 ENCOUNTER — APPOINTMENT (OUTPATIENT)
Dept: OBGYN | Facility: CLINIC | Age: 64
End: 2024-11-25
Payer: COMMERCIAL

## 2024-11-25 VITALS — SYSTOLIC BLOOD PRESSURE: 122 MMHG | DIASTOLIC BLOOD PRESSURE: 80 MMHG

## 2024-11-25 DIAGNOSIS — D21.9 BENIGN NEOPLASM OF CONNECTIVE AND OTHER SOFT TISSUE, UNSPECIFIED: ICD-10-CM

## 2024-11-25 DIAGNOSIS — N95.0 POSTMENOPAUSAL BLEEDING: ICD-10-CM

## 2024-11-25 PROCEDURE — 99214 OFFICE O/P EST MOD 30 MIN: CPT

## 2025-03-25 ENCOUNTER — NON-APPOINTMENT (OUTPATIENT)
Age: 65
End: 2025-03-25

## 2025-03-25 ENCOUNTER — LABORATORY RESULT (OUTPATIENT)
Age: 65
End: 2025-03-25

## 2025-03-25 ENCOUNTER — APPOINTMENT (OUTPATIENT)
Dept: CARDIOLOGY | Facility: CLINIC | Age: 65
End: 2025-03-25
Payer: COMMERCIAL

## 2025-03-25 VITALS
TEMPERATURE: 97.8 F | BODY MASS INDEX: 44.16 KG/M2 | HEART RATE: 59 BPM | HEIGHT: 62 IN | SYSTOLIC BLOOD PRESSURE: 126 MMHG | DIASTOLIC BLOOD PRESSURE: 80 MMHG | RESPIRATION RATE: 16 BRPM | WEIGHT: 240 LBS | OXYGEN SATURATION: 97 %

## 2025-03-25 DIAGNOSIS — E78.5 HYPERLIPIDEMIA, UNSPECIFIED: ICD-10-CM

## 2025-03-25 DIAGNOSIS — I07.1 RHEUMATIC TRICUSPID INSUFFICIENCY: ICD-10-CM

## 2025-03-25 DIAGNOSIS — E11.9 TYPE 2 DIABETES MELLITUS W/OUT COMPLICATIONS: ICD-10-CM

## 2025-03-25 DIAGNOSIS — I10 ESSENTIAL (PRIMARY) HYPERTENSION: ICD-10-CM

## 2025-03-25 DIAGNOSIS — I08.0 RHEUMATIC DISORDERS OF BOTH MITRAL AND AORTIC VALVES: ICD-10-CM

## 2025-03-25 DIAGNOSIS — R06.09 OTHER FORMS OF DYSPNEA: ICD-10-CM

## 2025-03-25 PROCEDURE — 99214 OFFICE O/P EST MOD 30 MIN: CPT

## 2025-03-25 PROCEDURE — G2211 COMPLEX E/M VISIT ADD ON: CPT | Mod: NC

## 2025-03-25 PROCEDURE — 93306 TTE W/DOPPLER COMPLETE: CPT

## 2025-03-25 PROCEDURE — 93000 ELECTROCARDIOGRAM COMPLETE: CPT

## 2025-03-27 ENCOUNTER — TRANSCRIPTION ENCOUNTER (OUTPATIENT)
Age: 65
End: 2025-03-27

## 2025-05-09 NOTE — ASU PREOP CHECKLIST - HEART RATE (BEATS/MIN)
Discussed with patient: AUDIT score of 4 UDS/Identified Substance(s) used: Alcohol/ Marijuana  Risks discussed included: Physical and Mental Health Risks  Recommendations discussed: IP/OP D&A Services  Patient's response: Patient declined D&A Services at this time     94 97

## 2025-08-01 ENCOUNTER — APPOINTMENT (OUTPATIENT)
Dept: SURGERY | Facility: CLINIC | Age: 65
End: 2025-08-01

## 2025-08-18 ENCOUNTER — NON-APPOINTMENT (OUTPATIENT)
Age: 65
End: 2025-08-18

## 2025-08-19 ENCOUNTER — LABORATORY RESULT (OUTPATIENT)
Age: 65
End: 2025-08-19

## 2025-08-19 ENCOUNTER — APPOINTMENT (OUTPATIENT)
Dept: CARDIOLOGY | Facility: CLINIC | Age: 65
End: 2025-08-19

## 2025-08-19 VITALS
SYSTOLIC BLOOD PRESSURE: 124 MMHG | OXYGEN SATURATION: 99 % | RESPIRATION RATE: 16 BRPM | BODY MASS INDEX: 42.33 KG/M2 | WEIGHT: 230 LBS | DIASTOLIC BLOOD PRESSURE: 72 MMHG | HEIGHT: 62 IN | HEART RATE: 70 BPM

## 2025-08-19 DIAGNOSIS — I07.1 RHEUMATIC TRICUSPID INSUFFICIENCY: ICD-10-CM

## 2025-08-19 DIAGNOSIS — E11.9 TYPE 2 DIABETES MELLITUS W/OUT COMPLICATIONS: ICD-10-CM

## 2025-08-19 DIAGNOSIS — I08.0 RHEUMATIC DISORDERS OF BOTH MITRAL AND AORTIC VALVES: ICD-10-CM

## 2025-08-19 DIAGNOSIS — E78.5 HYPERLIPIDEMIA, UNSPECIFIED: ICD-10-CM

## 2025-08-19 DIAGNOSIS — I10 ESSENTIAL (PRIMARY) HYPERTENSION: ICD-10-CM

## 2025-08-19 PROCEDURE — 93000 ELECTROCARDIOGRAM COMPLETE: CPT

## 2025-08-19 PROCEDURE — G2211 COMPLEX E/M VISIT ADD ON: CPT | Mod: NC

## 2025-08-19 PROCEDURE — 99214 OFFICE O/P EST MOD 30 MIN: CPT

## 2025-09-09 ENCOUNTER — APPOINTMENT (OUTPATIENT)
Dept: OBGYN | Facility: CLINIC | Age: 65
End: 2025-09-09
Payer: COMMERCIAL

## 2025-09-09 DIAGNOSIS — D21.9 BENIGN NEOPLASM OF CONNECTIVE AND OTHER SOFT TISSUE, UNSPECIFIED: ICD-10-CM

## 2025-09-09 PROCEDURE — 99212 OFFICE O/P EST SF 10 MIN: CPT | Mod: 95

## (undated) DEVICE — CATH IV SAFE BC 22G X 1" (BLUE)

## (undated) DEVICE — VENODYNE/SCD SLEEVE CALF LARGE

## (undated) DEVICE — INSUFFLATION NDL COVIDIEN STEP 14G FOR STEP/VERSASTEP

## (undated) DEVICE — LIGASURE MARYLAND 44CM

## (undated) DEVICE — IRRIGATION TRAY W PISTON SYRINGE 60ML

## (undated) DEVICE — DRSG MASTISOL

## (undated) DEVICE — ELCTR LAPAROSCOPIC WIRE L HOOK 36CM

## (undated) DEVICE — TUBING STRYKEFLOW II SUCTION / IRRIGATOR

## (undated) DEVICE — PREP CHLORAPREP HI-LITE ORANGE 26ML

## (undated) DEVICE — UNDERPAD LINEN SAVER 17 X 24"

## (undated) DEVICE — TUBING SUCTION 20FT

## (undated) DEVICE — SPECIMEN CONTAINER 100ML

## (undated) DEVICE — GOWN LG

## (undated) DEVICE — TUBING IV SET GRAVITY 3Y 100" MACRO

## (undated) DEVICE — DRAIN PENROSE .25" X 18" LATEX

## (undated) DEVICE — GLV 7 PROTEXIS (BLUE)

## (undated) DEVICE — GLV 6.5 PROTEXIS (WHITE)

## (undated) DEVICE — SUT MONOCRYL 4-0 18" PS-2

## (undated) DEVICE — DRSG 2X2

## (undated) DEVICE — ELCTR ECG CONDUCTIVE ADHESIVE

## (undated) DEVICE — VISIGI 3D SLEEVE GASTRECTOMY 36FR

## (undated) DEVICE — LUBRICATING JELLY HR ONE SHOT 3G

## (undated) DEVICE — SUT POLYSORB 0 60" TIES UNDYED

## (undated) DEVICE — TROCAR COVIDIEN VERSAPORT BLADELESS OPTICAL 5MM STANDARD

## (undated) DEVICE — SYR ASEPTO

## (undated) DEVICE — TUBING PLUME AWAY 4.0

## (undated) DEVICE — SUT ETHIBOND 2-0 44" EN3

## (undated) DEVICE — VISIGI 3D SLEEVE GASTRECTOMY 40FR

## (undated) DEVICE — DRAPE TOWEL BLUE 17" X 24"

## (undated) DEVICE — DENTURE CUP PINK

## (undated) DEVICE — TUBING INSUFFLATION LAP FILTER 10FT

## (undated) DEVICE — BLADE SCALPEL SAFETYLOCK #11

## (undated) DEVICE — BITE BLOCK ADULT 20 X 27MM (GREEN)

## (undated) DEVICE — PACK ADVANCED LAPAROSCOPIC NS

## (undated) DEVICE — DRSG CURITY GAUZE SPONGE 4 X 4" 12-PLY NON-STERILE

## (undated) DEVICE — CLAMP BX HOT RAD JAW 3

## (undated) DEVICE — TROCAR COVIDIEN VERSAPORT BLADELESS OPTICAL 15MM STANDARD

## (undated) DEVICE — D HELP - CLEARVIEW CLEARIFY SYSTEM

## (undated) DEVICE — GLV 7 PROTEXIS (WHITE)

## (undated) DEVICE — SOL IRR POUR NS 0.9% 500ML

## (undated) DEVICE — LINE BREATHE SAMPLNG

## (undated) DEVICE — WARMING BLANKET UPPER ADULT

## (undated) DEVICE — TUBING MEDI-VAC W MAXIGRIP CONNECTORS 1/4"X6'

## (undated) DEVICE — GOWN XL EXTRA LONG

## (undated) DEVICE — BIOPSY FORCEP COLD DISP

## (undated) DEVICE — GOWN TRIMAX LG

## (undated) DEVICE — COVIDIEN SIGNIA POWER CONTROL SHELL

## (undated) DEVICE — SALIVA EJECTOR (BLUE)

## (undated) DEVICE — BIOPSY FORCEP RADIAL JAW 4 STANDARD WITH NEEDLE

## (undated) DEVICE — PACK IV START WITH CHG

## (undated) DEVICE — TAPE SILK 3"

## (undated) DEVICE — TROCAR COVIDIEN VERSAPORT BLADELESS OPTICAL 12MM STANDARD

## (undated) DEVICE — DRSG STERISTRIPS 0.5 X 4"

## (undated) DEVICE — BASIN EMESIS 10IN GRADUATED MAUVE

## (undated) DEVICE — POSITIONER FOAM EGG CRATE ULNAR 2PCS (PINK)

## (undated) DEVICE — SOL IRR POUR H2O 250ML

## (undated) DEVICE — DRAPE MAYO STAND 30"

## (undated) DEVICE — DRSG BANDAID 0.75X3"

## (undated) DEVICE — TUBING CAP SET ERBEFLO CLEVERCAP HYBRID CO2 FOR OLYMPUS SCOPES AND UCR

## (undated) DEVICE — CONTAINER FORMALIN 80ML YELLOW

## (undated) DEVICE — MEDICATION LABELS W MARKER

## (undated) DEVICE — SUT ETHIBOND 0 44" EN3